# Patient Record
Sex: MALE | Race: BLACK OR AFRICAN AMERICAN | Employment: UNEMPLOYED | URBAN - METROPOLITAN AREA
[De-identification: names, ages, dates, MRNs, and addresses within clinical notes are randomized per-mention and may not be internally consistent; named-entity substitution may affect disease eponyms.]

---

## 2017-11-22 ENCOUNTER — HOSPITAL ENCOUNTER (OUTPATIENT)
Dept: PHYSICAL THERAPY | Age: 52
End: 2017-11-22

## 2018-08-16 PROBLEM — E66.01 OBESITY, MORBID (HCC): Status: ACTIVE | Noted: 2018-08-16

## 2018-08-23 PROBLEM — F32.0 CURRENT MILD EPISODE OF MAJOR DEPRESSIVE DISORDER WITHOUT PRIOR EPISODE (HCC): Status: ACTIVE | Noted: 2018-08-23

## 2018-08-23 PROBLEM — J43.2 CENTRILOBULAR EMPHYSEMA (HCC): Status: ACTIVE | Noted: 2018-08-23

## 2018-08-23 PROBLEM — R73.01 IMPAIRED FASTING GLUCOSE: Status: ACTIVE | Noted: 2018-08-23

## 2021-06-03 PROBLEM — M1A.0710 CHRONIC IDIOPATHIC GOUT INVOLVING TOE OF RIGHT FOOT WITHOUT TOPHUS: Status: ACTIVE | Noted: 2021-06-03

## 2021-06-03 PROBLEM — Z98.84 STATUS POST BARIATRIC SURGERY: Status: ACTIVE | Noted: 2021-06-03

## 2021-06-03 PROBLEM — R33.8 BENIGN PROSTATIC HYPERPLASIA WITH URINARY RETENTION: Status: ACTIVE | Noted: 2021-06-03

## 2021-06-03 PROBLEM — I10 ESSENTIAL HYPERTENSION: Status: ACTIVE | Noted: 2021-06-03

## 2021-06-03 PROBLEM — N40.1 BENIGN PROSTATIC HYPERPLASIA WITH URINARY RETENTION: Status: ACTIVE | Noted: 2021-06-03

## 2021-07-12 PROBLEM — R60.9 PERIPHERAL EDEMA: Status: ACTIVE | Noted: 2021-07-12

## 2022-03-18 PROBLEM — J43.2 CENTRILOBULAR EMPHYSEMA (HCC): Status: ACTIVE | Noted: 2018-08-23

## 2022-03-18 PROBLEM — I10 ESSENTIAL HYPERTENSION: Status: ACTIVE | Noted: 2021-06-03

## 2022-03-19 PROBLEM — R60.9 PERIPHERAL EDEMA: Status: ACTIVE | Noted: 2021-07-12

## 2022-03-19 PROBLEM — R60.0 PERIPHERAL EDEMA: Status: ACTIVE | Noted: 2021-07-12

## 2022-03-19 PROBLEM — E66.01 OBESITY, MORBID (HCC): Status: ACTIVE | Noted: 2018-08-16

## 2022-03-19 PROBLEM — F32.0 CURRENT MILD EPISODE OF MAJOR DEPRESSIVE DISORDER WITHOUT PRIOR EPISODE (HCC): Status: ACTIVE | Noted: 2018-08-23

## 2022-03-19 PROBLEM — R73.01 IMPAIRED FASTING BLOOD SUGAR: Status: ACTIVE | Noted: 2018-08-23

## 2022-03-19 PROBLEM — M1A.0710 CHRONIC IDIOPATHIC GOUT INVOLVING TOE OF RIGHT FOOT WITHOUT TOPHUS: Status: ACTIVE | Noted: 2021-06-03

## 2022-03-19 PROBLEM — Z98.84 STATUS POST BARIATRIC SURGERY: Status: ACTIVE | Noted: 2021-06-03

## 2022-03-20 PROBLEM — R33.8 BENIGN PROSTATIC HYPERPLASIA WITH URINARY RETENTION: Status: ACTIVE | Noted: 2021-06-03

## 2022-03-20 PROBLEM — N40.1 BENIGN PROSTATIC HYPERPLASIA WITH URINARY RETENTION: Status: ACTIVE | Noted: 2021-06-03

## 2025-02-25 ENCOUNTER — OFFICE VISIT (OUTPATIENT)
Dept: ORTHOPEDIC SURGERY | Age: 60
End: 2025-02-25
Payer: MEDICARE

## 2025-02-25 VITALS — BODY MASS INDEX: 35.05 KG/M2 | HEIGHT: 70 IN | WEIGHT: 244.8 LBS

## 2025-02-25 DIAGNOSIS — M17.11 PRIMARY OSTEOARTHRITIS OF RIGHT KNEE: ICD-10-CM

## 2025-02-25 DIAGNOSIS — M25.561 PAIN IN BOTH KNEES, UNSPECIFIED CHRONICITY: Primary | ICD-10-CM

## 2025-02-25 DIAGNOSIS — M17.11 ARTHRITIS OF RIGHT KNEE: Primary | ICD-10-CM

## 2025-02-25 DIAGNOSIS — M17.11 ARTHRITIS OF RIGHT KNEE: ICD-10-CM

## 2025-02-25 DIAGNOSIS — M25.562 PAIN IN BOTH KNEES, UNSPECIFIED CHRONICITY: Primary | ICD-10-CM

## 2025-02-25 DIAGNOSIS — M76.892 PES ANSERINUS TENDINITIS OF LEFT LOWER EXTREMITY: ICD-10-CM

## 2025-02-25 PROCEDURE — 99204 OFFICE O/P NEW MOD 45 MIN: CPT | Performed by: ORTHOPAEDIC SURGERY

## 2025-02-25 PROCEDURE — 1036F TOBACCO NON-USER: CPT | Performed by: ORTHOPAEDIC SURGERY

## 2025-02-25 PROCEDURE — 3017F COLORECTAL CA SCREEN DOC REV: CPT | Performed by: ORTHOPAEDIC SURGERY

## 2025-02-25 PROCEDURE — G8419 CALC BMI OUT NRM PARAM NOF/U: HCPCS | Performed by: ORTHOPAEDIC SURGERY

## 2025-02-25 PROCEDURE — G8427 DOCREV CUR MEDS BY ELIG CLIN: HCPCS | Performed by: ORTHOPAEDIC SURGERY

## 2025-02-25 RX ORDER — MULTIVITAMIN,THERAPEUTIC
1 TABLET ORAL EVERY MORNING
COMMUNITY

## 2025-02-25 RX ORDER — ALLOPURINOL 100 MG/1
100 TABLET ORAL 2 TIMES DAILY
COMMUNITY
Start: 2025-01-02

## 2025-02-25 RX ORDER — TRAMADOL HYDROCHLORIDE 50 MG/1
50 TABLET ORAL EVERY 6 HOURS PRN
COMMUNITY

## 2025-02-25 RX ORDER — ACETAMINOPHEN 500 MG
500 TABLET ORAL 3 TIMES DAILY PRN
COMMUNITY

## 2025-02-25 RX ORDER — POLYETHYLENE GLYCOL 3350 17 G/17G
17 POWDER, FOR SOLUTION ORAL DAILY
COMMUNITY
Start: 2024-12-17

## 2025-02-25 RX ORDER — METHOCARBAMOL 750 MG/1
750 TABLET, FILM COATED ORAL 3 TIMES DAILY
COMMUNITY

## 2025-02-25 RX ORDER — VALSARTAN 40 MG/1
40 TABLET ORAL DAILY
COMMUNITY
Start: 2025-01-08 | End: 2025-07-07

## 2025-02-25 NOTE — PROGRESS NOTES
Patient ID:  Yazan Lorenzo  [id number]  59 y.o.  1965    Today: February 25, 2025          Chief Complaint: Right knee pain    HPI:       Yazan Lorenzo is a 59 y.o. male seen for evaluation and treatment of right knee pain. Patient reports a longstanding history of pain involving the knee. The patient complains of knee pain with activities, reports pain as mostly occurring along the joint lines, reports stiffness of the knee with prolonged inactivity, and swelling/pain at the end of the day and after increased physical activity. Generally, symptoms improve with sitting/rest. The pain affects the patient’s activities of daily living and quality of life. Patient reports progressive pain and instability in the knee. The pain has been ongoing for an extended period of time. Pain ranges from approximately 4-8 in a cyclical fashion with periods of acute exacerbation.  He has had an injection with some e-stim therapy.  He uses a rolling walker for ambulation.  He is on no anticoagulation he is a non-smoker nondiabetic lives with his wife.  He does have a history of a left total knee arthroplasty by Dr. 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 tophus 6/3/2021    Chronic low back pain     Chronic systolic heart failure (HCC)     Daytime sleepiness     Depression     Dizziness     Exertional dyspnea     History of arthritis     History of asthma     History of CHF (congestive heart failure)     History of iron deficiency anemia     History of peripheral edema     History of tinea     Hypertension     Impaired fasting glucose 8/23/2018    Knee pain     osteoarthritis of both knees    Low energy     Memory difficulty     Morbid obesity     Musculoskeletal disorder     Nervousness     MICHELLE (obstructive sleep apnea)     Sleep apnea     SOB (shortness of breath)

## 2025-02-26 ENCOUNTER — PREP FOR PROCEDURE (OUTPATIENT)
Dept: ORTHOPEDIC SURGERY | Age: 60
End: 2025-02-26

## 2025-02-26 DIAGNOSIS — M17.11 ARTHRITIS OF RIGHT KNEE: Primary | ICD-10-CM

## 2025-02-26 RX ORDER — SODIUM CHLORIDE 0.9 % (FLUSH) 0.9 %
5-40 SYRINGE (ML) INJECTION EVERY 12 HOURS SCHEDULED
Status: CANCELLED | OUTPATIENT
Start: 2025-02-26

## 2025-02-26 RX ORDER — ACETAMINOPHEN 325 MG/1
1000 TABLET ORAL ONCE
Status: CANCELLED | OUTPATIENT
Start: 2025-02-26 | End: 2025-02-26

## 2025-02-26 RX ORDER — SODIUM CHLORIDE 9 MG/ML
INJECTION, SOLUTION INTRAVENOUS PRN
Status: CANCELLED | OUTPATIENT
Start: 2025-02-26

## 2025-02-26 RX ORDER — SODIUM CHLORIDE 0.9 % (FLUSH) 0.9 %
5-40 SYRINGE (ML) INJECTION PRN
Status: CANCELLED | OUTPATIENT
Start: 2025-02-26

## 2025-02-26 NOTE — H&P
with ROM of the right knee. Range of motion is . Trace effusion noted in the knee. Patellofemoral crepitus is present. Stable to varus valgus stress at 0 and 30, negative lachman, positive [place] for pain.  Fires ehl fhl ta gs p splt s/s/sp/dp/t wwp bcr.  significant varus alignment    Left knee exam well-healed incision no effusion knee range of motion 10 to 15 degrees lacking of full extension to 120 degrees of flexion stable to varus valgus stress at full extension no increased translation in the AP plane tenderness about the pes tendons      Xrays (obtained today or previously):    Heading: XR Knee 3/4 View  Views: AP, skier PA, lateral knee, sunrise view bilateral knee  Clinical indication: Bilateral knee Pain   Findings: Xrays of the knees obtained today or previously show tricompartmental bone-on-bone arthritic changes of the right knee with associated osteophyte formation and subchondral sclerosis.  Significant varus deformity cemented  Impression: Left Knee osteoarthritis Kellgren-Hayes grade 4    Kirt Kern MD    Assessment:   1. Arthritis of the Left knee    1. end stage osteoarthritis Right knee    Plan:   The patient has end stage osteoarthritis of the right kneewith severe worsening pain which has not improved despite non operative treatments.    X-rays demonstrate end stage osteoarthritis of the right knee  They have tried non operative treatments as outlined per HPI and have declined additional non-surgical care due to worsening pain and limited mobility including, additional NSAIDs, cortisone injections, physical therapy, assistive devices, and pain management.    The symptoms have worsened and ambulation has become difficulty.  After discussion involving shared decision making of different treatment options, they have elected to proceed with a  total knee arthroplastyand this is medically necessary for failed non operative treatment of end stage osteoarthritis.    The surgery

## 2025-02-27 ENCOUNTER — TELEPHONE (OUTPATIENT)
Dept: ORTHOPEDIC SURGERY | Age: 60
End: 2025-02-27

## 2025-02-28 NOTE — TELEPHONE ENCOUNTER
Spoke with patient and informed him that he will have blood work done at his [facility] appointment on 3/10/2025. Patient verbalized understanding and voiced no other concerns at this time.

## 2025-03-07 ENCOUNTER — TELEPHONE (OUTPATIENT)
Dept: ORTHOPEDIC SURGERY | Age: 60
End: 2025-03-07

## 2025-03-07 NOTE — TELEPHONE ENCOUNTER
He is asking for a return call about a text he got about an appt on Monday. He also has a question about joint camp. Please return his call.

## 2025-03-07 NOTE — TELEPHONE ENCOUNTER
Spoke with patient and informed him that there is parking outside of the gate and to be there at 9 am. Patient aware that joint camp appointment will be about 2 hours and he doesn't have to move his car.   Patient verbalized understanding and voiced no other concerns at this time.

## 2025-03-10 ENCOUNTER — HOSPITAL ENCOUNTER (OUTPATIENT)
Dept: REHABILITATION | Age: 60
Discharge: HOME | End: 2025-03-13
Payer: MEDICARE

## 2025-03-10 ENCOUNTER — HOSPITAL ENCOUNTER (OUTPATIENT)
Dept: SURGERY | Age: 60
Discharge: HOME | End: 2025-03-13
Payer: MEDICARE

## 2025-03-10 ENCOUNTER — TELEPHONE (OUTPATIENT)
Dept: ORTHOPEDIC SURGERY | Age: 60
End: 2025-03-10

## 2025-03-10 VITALS
HEART RATE: 78 BPM | DIASTOLIC BLOOD PRESSURE: 75 MMHG | OXYGEN SATURATION: 97 % | SYSTOLIC BLOOD PRESSURE: 136 MMHG | BODY MASS INDEX: 35.12 KG/M2 | WEIGHT: 245.3 LBS | TEMPERATURE: 97.5 F | HEIGHT: 70 IN | RESPIRATION RATE: 16 BRPM

## 2025-03-10 DIAGNOSIS — M17.11 ARTHRITIS OF RIGHT KNEE: ICD-10-CM

## 2025-03-10 LAB
ALBUMIN SERPL-MCNC: 2.6 G/DL (ref 3.5–5)
ALBUMIN/GLOB SERPL: 0.7 (ref 1–1.9)
ALP SERPL-CCNC: 96 U/L (ref 40–129)
ALT SERPL-CCNC: 50 U/L (ref 8–55)
ANION GAP SERPL CALC-SCNC: 10 MMOL/L (ref 7–16)
AST SERPL-CCNC: 121 U/L (ref 15–37)
BASOPHILS # BLD: 0.01 K/UL (ref 0–0.2)
BASOPHILS NFR BLD: 0.3 % (ref 0–2)
BILIRUB SERPL-MCNC: 0.7 MG/DL (ref 0–1.2)
BUN SERPL-MCNC: 11 MG/DL (ref 6–23)
CALCIUM SERPL-MCNC: 8.5 MG/DL (ref 8.8–10.2)
CHLORIDE SERPL-SCNC: 101 MMOL/L (ref 98–107)
CO2 SERPL-SCNC: 28 MMOL/L (ref 20–29)
CREAT SERPL-MCNC: 0.72 MG/DL (ref 0.8–1.3)
DIFFERENTIAL METHOD BLD: ABNORMAL
EKG ATRIAL RATE: 73 BPM
EKG DIAGNOSIS: NORMAL
EKG P AXIS: 52 DEGREES
EKG P-R INTERVAL: 188 MS
EKG Q-T INTERVAL: 410 MS
EKG QRS DURATION: 104 MS
EKG QTC CALCULATION (BAZETT): 451 MS
EKG R AXIS: 43 DEGREES
EKG T AXIS: 33 DEGREES
EKG VENTRICULAR RATE: 73 BPM
EOSINOPHIL # BLD: 0.02 K/UL (ref 0–0.8)
EOSINOPHIL NFR BLD: 0.5 % (ref 0.5–7.8)
ERYTHROCYTE [DISTWIDTH] IN BLOOD BY AUTOMATED COUNT: 13.8 % (ref 11.9–14.6)
EST. AVERAGE GLUCOSE BLD GHB EST-MCNC: 79 MG/DL
GLOBULIN SER CALC-MCNC: 3.8 G/DL (ref 2.3–3.5)
GLUCOSE SERPL-MCNC: 96 MG/DL (ref 70–99)
HBA1C MFR BLD: 4.4 % (ref 0–5.6)
HCT VFR BLD AUTO: 36.3 % (ref 41.1–50.3)
HGB BLD-MCNC: 12.1 G/DL (ref 13.6–17.2)
IMM GRANULOCYTES # BLD AUTO: 0.01 K/UL (ref 0–0.5)
IMM GRANULOCYTES NFR BLD AUTO: 0.3 % (ref 0–5)
INR PPP: 1
LYMPHOCYTES # BLD: 0.93 K/UL (ref 0.5–4.6)
LYMPHOCYTES NFR BLD: 24.9 % (ref 13–44)
MCH RBC QN AUTO: 35.7 PG (ref 26.1–32.9)
MCHC RBC AUTO-ENTMCNC: 33.3 G/DL (ref 31.4–35)
MCV RBC AUTO: 107.1 FL (ref 82–102)
MONOCYTES # BLD: 0.69 K/UL (ref 0.1–1.3)
MONOCYTES NFR BLD: 18.4 % (ref 4–12)
MRSA DNA SPEC QL NAA+PROBE: NOT DETECTED
NEUTS SEG # BLD: 2.08 K/UL (ref 1.7–8.2)
NEUTS SEG NFR BLD: 55.6 % (ref 43–78)
NRBC # BLD: 0 K/UL (ref 0–0.2)
PLATELET # BLD AUTO: 124 K/UL (ref 150–450)
PMV BLD AUTO: 9.8 FL (ref 9.4–12.3)
POTASSIUM SERPL-SCNC: 3.8 MMOL/L (ref 3.5–5.1)
PROT SERPL-MCNC: 6.4 G/DL (ref 6.3–8.2)
PROTHROMBIN TIME: 13.7 SEC (ref 11.3–14.9)
RBC # BLD AUTO: 3.39 M/UL (ref 4.23–5.6)
S AUREUS CPE NOSE QL NAA+PROBE: DETECTED
SODIUM SERPL-SCNC: 139 MMOL/L (ref 136–145)
WBC # BLD AUTO: 3.7 K/UL (ref 4.3–11.1)

## 2025-03-10 PROCEDURE — 87641 MR-STAPH DNA AMP PROBE: CPT

## 2025-03-10 PROCEDURE — 97161 PT EVAL LOW COMPLEX 20 MIN: CPT

## 2025-03-10 PROCEDURE — 85610 PROTHROMBIN TIME: CPT

## 2025-03-10 PROCEDURE — 93005 ELECTROCARDIOGRAM TRACING: CPT

## 2025-03-10 PROCEDURE — 80053 COMPREHEN METABOLIC PANEL: CPT

## 2025-03-10 PROCEDURE — 93010 ELECTROCARDIOGRAM REPORT: CPT | Performed by: INTERNAL MEDICINE

## 2025-03-10 PROCEDURE — 94760 N-INVAS EAR/PLS OXIMETRY 1: CPT

## 2025-03-10 PROCEDURE — 85025 COMPLETE CBC W/AUTO DIFF WBC: CPT

## 2025-03-10 PROCEDURE — 83036 HEMOGLOBIN GLYCOSYLATED A1C: CPT

## 2025-03-10 ASSESSMENT — PAIN DESCRIPTION - DESCRIPTORS: DESCRIPTORS: ACHING;CRAMPING;SHARP

## 2025-03-10 ASSESSMENT — PAIN DESCRIPTION - LOCATION
LOCATION: KNEE
LOCATION: KNEE

## 2025-03-10 ASSESSMENT — PAIN SCALES - GENERAL: PAINLEVEL_OUTOF10: 5

## 2025-03-10 ASSESSMENT — KOOS JR
STRAIGHTENING KNEE FULLY: SEVERE
BENDING TO THE FLOOR TO PICK UP OBJECT: SEVERE
RISING FROM SITTING: SEVERE
GOING UP OR DOWN STAIRS: SEVERE
HOW SEVERE IS YOUR KNEE STIFFNESS AFTER FIRST WAKING IN MORNING: SEVERE
TWISING OR PIVOTING ON KNEE: SEVERE
STANDING UPRIGHT: SEVERE
KOOS JR TOTAL INTERVAL SCORE: 34.174

## 2025-03-10 ASSESSMENT — PULMONARY FUNCTION TESTS
FEV1 (%PREDICTED): 59
FEV1 (LITERS): 1.77

## 2025-03-10 ASSESSMENT — PAIN DESCRIPTION - ORIENTATION: ORIENTATION: RIGHT

## 2025-03-10 NOTE — PERIOP NOTE
PLEASE CONTINUE TAKING ALL PRESCRIPTION MEDICATIONS UP TO THE DAY OF SURGERY UNLESS OTHERWISE DIRECTED BELOW.    DISCONTINUE all vitamins, herbals and supplements 3 weeks prior to surgery. DISCONTINUE Non-Steroidal Anti-Inflammatory (NSAIDS) such as Advil, Ibuprofen, Motrin, Naproxen and Aleve 5 days prior to surgery.       Home Medications to take  the day of surgery      Allopurinol (Zyloprim)     Montelukast (Singulair)     Tamsulosin (Flomax)     Home Medications to Hold- please continue all other medications except these.    Vitamin B complex  Vitamin C  Multivitamin   Omega 3 (Fish Oil)  Diclofenac (Voltaren Gel)- hold 5 days prior to surgery     Comments   Bring Dynahex wash and Incentive Spirometer with you to hospital on the day of surgery.             Please do not bring home medications with you on the day of surgery unless otherwise directed by your nurse.  If you are instructed to bring home medications, please give them to your nurse as they will be administered by the nursing staff.    If you have any questions, please call [facility] [phone].    A copy of this note was provided to the patient for reference.    How to Use Your Incentive Spirometer       About Your Incentive Spirometer  An incentive spirometer is a device that will expand your lungs by helping you to breathe more deeply and fully. The parts of your incentive spirometer are labeled in Figure 1.    Using your incentive spirometer  When you're using your incentive spirometer, make sure to breathe through your mouth. If you breathe through your nose, the incentive spirometer won't work properly. You can hold your nose if you have trouble. DO NOT BLOW INTO THE DEVICE. If you feel dizzy at any time, stop and rest. Try again at a later time.  Sit upright in a chair or in bed. Hold the incentive spirometer at eye level.   Put the mouthpiece in your mouth and close your lips tightly around it. Slowly breathe out

## 2025-03-10 NOTE — TELEPHONE ENCOUNTER
He had labs drawn at [facility] this morning and would like the results. Please give him a call.

## 2025-03-10 NOTE — CARE COORDINATION
[facility] Case Management note:  Patient screened in Prehab for discharge planning needs. Patient scheduled for a future total joint replacement.  We discussed Home Health and equipment needed after surgery. List of Home Health providers offered.  Patient w/o preference towards provider.  We will arrange Home health on the day of surgery.  Has two walkers and grab bar around toilet, and shower chair.

## 2025-03-10 NOTE — PROGRESS NOTES
Yazan Lorenzo  : 1965  Primary: [employer]  Secondary:  [facility] at [place] [address]  [phone]      Physical Therapy Prehab Evaluation Summary:3/10/2025   Time In/Out   PT Charge Capture  Episode     MEDICAL/REFERRING DIAGNOSIS: Unilateral primary osteoarthritis, right knee [M17.11]  REFERRING PHYSICIAN: Kirt Kern MD    Treatment Diagnosis:   Pain in Right Knee (M25.561)  Stiffness of Right Knee, Not elsewhere classified (M25.661)  Difficulty in walking, Not elsewhere classified (R26.2)    DATE OF SURGERY: 4/3/25  Assessment:   COMMENTS:  Mr. Lorenzo is present for a Prehab Physical Therapy Assessment for their upcoming right TKA. They are here alone. After discussing the surgical admission options and discharge plans, they are planning on discharging on day of surgery.    He will have support from his wife. He had a left tka last year.  He is on a rollator and reports he has back pain and spasms.     PROBLEM LIST:   (Impacting functional limitations):  Mr. Lorenzo presents with the following lower extremity(s) problems:  Strength  Range of Motion  Home Exercise Program  Pain INTERVENTIONS PLANNED:   (Benefits and precautions of physical therapy have been discussed with the patient.)  Home Exercise Program  Educational Discussion       GOALS: (Goals have been discussed and agreed upon with patient.)  Discharge Goals: Time Frame: 1 Day  Patient will demonstrate independence with a home exercise program designed to increase strength, range of motion, and pain control to minimize functional deficits and optimize patient for total joint replacement.    Subjective:   Past Medical History/Comorbidities:   Mr. Lorenzo  has a past medical history of Centrilobular emphysema (HCC), Chronic idiopathic gout involving toe of right foot without tophus, Chronic low back pain, Chronic systolic heart failure (HCC), Daytime sleepiness,

## 2025-03-10 NOTE — PERIOP NOTE
Patient verified name and .    Order for consent was found in EHR and does match case posting; patient verified.     Type 3 surgery, [facility] assessment complete.    Labs per surgeon: CBC with diff,CMP, A1C, PT/INR, MRSA ; results pending. T/S ordered DOS.  Labs per anesthesia protocol: no additional  EKG:EKG 3.10.25    Procedure Pass- Labs completed on 12.15.24 AST: 620,ALT:266. Alkaline Phosphate: 255, Bilirubin:3.1, Albumin:2.5. Pt with hx of alcohol dependence. Pt had a Lap. Cholecystectomy on 24. Pt states his last alcoholic drink was over 3 months ago. Pt LFT's remain elevated. PCP has placed [facility]. Pt states he plans to contact [facility] today as instructed by his PCP. Pt states he had his Left knee replacement at [facility] and has attempted to schedule right knee replacement with no return call. At some point GI clearance was requested prior to knee surgery per office visit on 25.     MRSA/MSSA swab collected per policy. MD to consult pharmacy to dose Vanc if appropriate.     Hospital approved surgical skin cleanser and instructions to return bottle on DOS given per hospital policy.    Patient provided with handouts including Guide to Surgery, Pain Management, Preventing Surgical Site Infections, and [facility] Brochure.    Patient answered medical/surgical history questions at their best of ability. All prior to admission medications documented in Epic. Original medication prescription bottle was not visualized during patient appointment.     Patient instructed to hold all vitamins 3 weeks prior to surgery and NSAIDS 5 days prior to surgery.     Patient teach back successful and patient demonstrates knowledge of instruction.

## 2025-03-10 NOTE — PERIOP NOTE
Labs within anesthesia guidelines, no follow-up required.  ALT: 50, , WBC:3.7-Labs automatically routed to ordering provider via Epic documentation. MRSA received in lab with pending results.

## 2025-03-10 NOTE — TELEPHONE ENCOUNTER
Spoke with patient and informed him that his labs results are in mychart and if there is a problem someone will call him to let him know.   Patient verbalized understanding and voiced no other concerns at this time.

## 2025-03-10 NOTE — PROGRESS NOTES
03/10/25 1000   Treatment   Treatment Type Bedside spirometry   Breath Sounds   Breath Sounds Bilateral Diminished   Oxygen Therapy/Pulse Ox   O2 Therapy Room air   Pulse 78   SpO2 97 %   Pulse Oximeter Device Mode Intermittent   $Pulse Oximeter $Spot check (single)   Bedside Spirometry   FEV-1/Actual (Liters) 1.77 Liters   FEV-1/Predicted (Liters) 59 Liters     Initial respiratory Assessment completed with pt. Pt was interviewed and evaluated in [facility] prior to surgery.  Patient ID:  Yazan Lorenzo  [id number]  59 y.o.  1965  Surgeon:  HEATHER  Date of Surgery: [unfilled]4/3/2025  Procedure: Total  Arthroplasty  Primary Care Physician: Krish Rahman [phone]  Specialists:    Pt taught proper COUGH technique  IS REVIEWED WITH PT AS WELL AS BENEFITS OF USING IS IN SEDENTARY PTS.  DIAPHRAGMATIC BREATHING EXERCISE INSTRUCTIONS GIVEN    History of smoking:   DENIES                 Quit date:         Secondhand smoke:DENIES    Past procedures with Oxygen desaturation or delayed awakening:DENIES     Respiratory history:COPD/ASTHMA  MICHELLE - NO CPAP                                 SOB  ON EXERTION                                    Respiratory meds:  DENIES    FAMILY PRESENT:             NO     PAST SLEEP STUDY:        YES                   LOST WEIGHT BUT NEVER HAD FOLLOW UP STUDY  HX OF MICHELLE:                        YES                      MICHELLE assessment:     DANGERS OF UNTREATED MICHELLE EXPLAINED TO PT.                                          SLEEPS ON SIDE         PHYSICAL EXAM   Body mass index is 35.2 kg/m².   Vitals:    03/10/25 1000   BP:    Pulse: (P) 78   Resp:    Temp:    SpO2: (P) 97%     Neck [id number]      cm    Loud snoring:                                                 YES            Witnessed apnea or wakening gasping or choking:        DENIES         Awakens with headaches:                                               DENIES  Morning or daytime tiredness/ sleepiness:

## 2025-03-18 ENCOUNTER — TELEPHONE (OUTPATIENT)
Dept: ORTHOPEDIC SURGERY | Age: 60
End: 2025-03-18

## 2025-03-18 NOTE — TELEPHONE ENCOUNTER
Spoke with patient and rescheduled Pre Op to 3/26/2025 @ 1 pm at [facility]. Patient verbalized understanding and voiced no other concerns at this time.

## 2025-03-26 ENCOUNTER — OFFICE VISIT (OUTPATIENT)
Dept: ORTHOPEDIC SURGERY | Age: 60
End: 2025-03-26

## 2025-03-26 VITALS — WEIGHT: 243.4 LBS | HEIGHT: 70 IN | BODY MASS INDEX: 34.84 KG/M2

## 2025-03-26 DIAGNOSIS — M17.11 ARTHRITIS OF RIGHT KNEE: Primary | ICD-10-CM

## 2025-03-26 PROCEDURE — 99024 POSTOP FOLLOW-UP VISIT: CPT | Performed by: ORTHOPAEDIC SURGERY

## 2025-03-26 RX ORDER — TRAMADOL HYDROCHLORIDE 50 MG/1
50 TABLET ORAL EVERY 6 HOURS PRN
Qty: 28 TABLET | Refills: 0 | Status: SHIPPED | OUTPATIENT
Start: 2025-03-26 | End: 2025-04-02

## 2025-03-26 RX ORDER — CELECOXIB 200 MG/1
200 CAPSULE ORAL 2 TIMES DAILY
Qty: 60 CAPSULE | Refills: 1 | Status: SHIPPED | OUTPATIENT
Start: 2025-03-26

## 2025-03-26 RX ORDER — OXYCODONE HYDROCHLORIDE 5 MG/1
5-10 TABLET ORAL EVERY 4 HOURS PRN
Qty: 30 TABLET | Refills: 0 | Status: SHIPPED | OUTPATIENT
Start: 2025-03-26 | End: 2025-04-02

## 2025-03-26 RX ORDER — SENNA AND DOCUSATE SODIUM 50; 8.6 MG/1; MG/1
1 TABLET, FILM COATED ORAL DAILY
Qty: 30 TABLET | Refills: 1 | Status: SHIPPED | OUTPATIENT
Start: 2025-03-26

## 2025-03-26 RX ORDER — ASPIRIN 81 MG/1
81 TABLET ORAL EVERY 12 HOURS
Qty: 70 TABLET | Refills: 0 | Status: SHIPPED | OUTPATIENT
Start: 2025-03-26

## 2025-03-26 RX ORDER — ACETAMINOPHEN 325 MG/1
975 TABLET ORAL EVERY 8 HOURS
Qty: 60 TABLET | Refills: 2 | Status: SHIPPED | OUTPATIENT
Start: 2025-03-26

## 2025-03-26 NOTE — H&P (VIEW-ONLY)
Patient ID:  Yazan Lorenzo  [id number]  59 y.o.  1965    Today: March 26, 2025          Chief Complaint: Right knee pain    HPI:       Yazan Lorenzo is a 59 y.o. male seen for evaluation and treatment of right knee pain. This is his preop visit.           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 Patient has stopped drinking for a few months. Referral to Gastroenterology for further evaluation has been delayed until July.    Exertional dyspnea     H/O gastric sleeve     History of alcohol abuse     History of arthritis     History of asthma     History of CHF (congestive heart failure)     History of iron deficiency anemia     History of peripheral edema     History of tinea     Hypertension     Impaired fasting glucose 8/23/2018    Knee pain     osteoarthritis of both knees    Low energy     Memory difficulty     Morbid obesity     BMI:35    Musculoskeletal disorder     Nervousness     MICHELLE (obstructive sleep apnea)     no longer required due to weight loss    SOB (shortness of breath)     3.10.25 pt denies any recent SOB or CP    Wears glasses     to read    Wheezing     3.10.25- no wheezing at present time       Past Surgical History:  Past Surgical History:   Procedure Laterality Date    CHOLECYSTECTOMY, LAPAROSCOPIC  12/26/2024    COLONOSCOPY  12/06/2020    No Polyps returnin 5 years.    GASTRIC BYPASS SURGERY      TOTAL KNEE ARTHROPLASTY Left 06/26/2023        Medications:     Prior to Admission medications    Medication Sig Start Date End Date Taking? Authorizing Provider   allopurinol (ZYLOPRIM) 100 MG tablet Take 1 tablet by mouth 2

## 2025-03-26 NOTE — PROGRESS NOTES
Patient ID:  Yazan Lorenzo  [id number]  59 y.o.  1965    Today: March 26, 2025          Chief Complaint: Right knee pain    HPI:       Yazan Lorenzo is a 59 y.o. male seen for evaluation and treatment of right knee pain. This is his preop visit.           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 Patient has stopped drinking for a few months. Referral to Gastroenterology for further evaluation has been delayed until July.    Exertional dyspnea     H/O gastric sleeve     History of alcohol abuse     History of arthritis     History of asthma     History of CHF (congestive heart failure)     History of iron deficiency anemia     History of peripheral edema     History of tinea     Hypertension     Impaired fasting glucose 8/23/2018    Knee pain     osteoarthritis of both knees    Low energy     Memory difficulty     Morbid obesity     BMI:35    Musculoskeletal disorder     Nervousness     MICHELLE (obstructive sleep apnea)     no longer required due to weight loss    SOB (shortness of breath)     3.10.25 pt denies any recent SOB or CP    Wears glasses     to read    Wheezing     3.10.25- no wheezing at present time       Past Surgical History:  Past Surgical History:   Procedure Laterality Date    CHOLECYSTECTOMY, LAPAROSCOPIC  12/26/2024    COLONOSCOPY  12/06/2020    No Polyps returnin 5 years.    GASTRIC BYPASS SURGERY      TOTAL KNEE ARTHROPLASTY Left 06/26/2023        Medications:     Prior to Admission medications    Medication Sig Start Date End Date Taking? Authorizing Provider   allopurinol (ZYLOPRIM) 100 MG tablet Take 1 tablet by mouth 2

## 2025-04-01 ENCOUNTER — APPOINTMENT (OUTPATIENT)
Dept: GENERAL RADIOLOGY | Age: 60
End: 2025-04-01
Attending: ORTHOPAEDIC SURGERY
Payer: MEDICARE

## 2025-04-01 ENCOUNTER — HOSPITAL ENCOUNTER (OUTPATIENT)
Age: 60
Discharge: HOME HEALTH | End: 2025-04-01
Attending: ORTHOPAEDIC SURGERY | Admitting: ORTHOPAEDIC SURGERY
Payer: MEDICARE

## 2025-04-01 ENCOUNTER — ANESTHESIA EVENT (OUTPATIENT)
Dept: SURGERY | Age: 60
End: 2025-04-01
Payer: MEDICARE

## 2025-04-01 ENCOUNTER — ANESTHESIA (OUTPATIENT)
Dept: SURGERY | Age: 60
End: 2025-04-01
Payer: MEDICARE

## 2025-04-01 VITALS
HEART RATE: 77 BPM | HEIGHT: 70 IN | TEMPERATURE: 97.9 F | BODY MASS INDEX: 34.7 KG/M2 | RESPIRATION RATE: 16 BRPM | SYSTOLIC BLOOD PRESSURE: 131 MMHG | WEIGHT: 242.4 LBS | OXYGEN SATURATION: 99 % | DIASTOLIC BLOOD PRESSURE: 77 MMHG

## 2025-04-01 PROBLEM — M17.11 ARTHRITIS OF RIGHT KNEE: Status: ACTIVE | Noted: 2025-04-01

## 2025-04-01 PROCEDURE — 2709999900 HC NON-CHARGEABLE SUPPLY: Performed by: ORTHOPAEDIC SURGERY

## 2025-04-01 PROCEDURE — 6360000002 HC RX W HCPCS: Performed by: ORTHOPAEDIC SURGERY

## 2025-04-01 PROCEDURE — 3700000000 HC ANESTHESIA ATTENDED CARE: Performed by: ORTHOPAEDIC SURGERY

## 2025-04-01 PROCEDURE — 94760 N-INVAS EAR/PLS OXIMETRY 1: CPT

## 2025-04-01 PROCEDURE — 6370000000 HC RX 637 (ALT 250 FOR IP): Performed by: ORTHOPAEDIC SURGERY

## 2025-04-01 PROCEDURE — 6360000002 HC RX W HCPCS: Performed by: ANESTHESIOLOGY

## 2025-04-01 PROCEDURE — 6370000000 HC RX 637 (ALT 250 FOR IP): Performed by: ANESTHESIOLOGY

## 2025-04-01 PROCEDURE — 6360000002 HC RX W HCPCS: Performed by: NURSE ANESTHETIST, CERTIFIED REGISTERED

## 2025-04-01 PROCEDURE — 97535 SELF CARE MNGMENT TRAINING: CPT

## 2025-04-01 PROCEDURE — 3600000005 HC SURGERY LEVEL 5 BASE: Performed by: ORTHOPAEDIC SURGERY

## 2025-04-01 PROCEDURE — 7100000001 HC PACU RECOVERY - ADDTL 15 MIN: Performed by: ORTHOPAEDIC SURGERY

## 2025-04-01 PROCEDURE — 27447 TOTAL KNEE ARTHROPLASTY: CPT | Performed by: ORTHOPAEDIC SURGERY

## 2025-04-01 PROCEDURE — 97165 OT EVAL LOW COMPLEX 30 MIN: CPT

## 2025-04-01 PROCEDURE — 97530 THERAPEUTIC ACTIVITIES: CPT

## 2025-04-01 PROCEDURE — 3600000015 HC SURGERY LEVEL 5 ADDTL 15MIN: Performed by: ORTHOPAEDIC SURGERY

## 2025-04-01 PROCEDURE — 97110 THERAPEUTIC EXERCISES: CPT

## 2025-04-01 PROCEDURE — 7100000000 HC PACU RECOVERY - FIRST 15 MIN: Performed by: ORTHOPAEDIC SURGERY

## 2025-04-01 PROCEDURE — C1776 JOINT DEVICE (IMPLANTABLE): HCPCS | Performed by: ORTHOPAEDIC SURGERY

## 2025-04-01 PROCEDURE — 73560 X-RAY EXAM OF KNEE 1 OR 2: CPT

## 2025-04-01 PROCEDURE — 64447 NJX AA&/STRD FEMORAL NRV IMG: CPT | Performed by: ANESTHESIOLOGY

## 2025-04-01 PROCEDURE — 97161 PT EVAL LOW COMPLEX 20 MIN: CPT

## 2025-04-01 PROCEDURE — 2500000003 HC RX 250 WO HCPCS: Performed by: NURSE ANESTHETIST, CERTIFIED REGISTERED

## 2025-04-01 PROCEDURE — 2580000003 HC RX 258: Performed by: ANESTHESIOLOGY

## 2025-04-01 PROCEDURE — 3700000001 HC ADD 15 MINUTES (ANESTHESIA): Performed by: ORTHOPAEDIC SURGERY

## 2025-04-01 DEVICE — ATTUNE KNEE SYSTEM TIBIAL INSERT FIXED BEARING MEDIAL STABILIZED RIGHT AOX 8, 5MM
Type: IMPLANTABLE DEVICE | Site: KNEE | Status: FUNCTIONAL
Brand: ATTUNE

## 2025-04-01 DEVICE — ATTUNE KNEE SYSTEM FEMORAL POROCOAT CRUCIATE RETAINING SIZE 8 RIGHT CEMENTLESS
Type: IMPLANTABLE DEVICE | Site: KNEE | Status: FUNCTIONAL
Brand: ATTUNE

## 2025-04-01 DEVICE — ATTUNE KNEE SYSTEM TIBIAL BASE AFFIXIUM FIXED BEARING SIZE 7
Type: IMPLANTABLE DEVICE | Site: KNEE | Status: FUNCTIONAL
Brand: ATTUNE AFFIXIUM

## 2025-04-01 DEVICE — KNEE K2 TOT HEMI ADV CMTLS IMPL CAPPED SYNTHES: Type: IMPLANTABLE DEVICE | Status: FUNCTIONAL

## 2025-04-01 RX ORDER — SODIUM CHLORIDE 0.9 % (FLUSH) 0.9 %
5-40 SYRINGE (ML) INJECTION PRN
Status: DISCONTINUED | OUTPATIENT
Start: 2025-04-01 | End: 2025-04-01 | Stop reason: HOSPADM

## 2025-04-01 RX ORDER — DIPHENHYDRAMINE HYDROCHLORIDE 50 MG/ML
25 INJECTION, SOLUTION INTRAMUSCULAR; INTRAVENOUS EVERY 6 HOURS PRN
Status: DISCONTINUED | OUTPATIENT
Start: 2025-04-01 | End: 2025-04-01 | Stop reason: HOSPADM

## 2025-04-01 RX ORDER — ASPIRIN 81 MG/1
81 TABLET ORAL 2 TIMES DAILY
Status: DISCONTINUED | OUTPATIENT
Start: 2025-04-01 | End: 2025-04-01 | Stop reason: HOSPADM

## 2025-04-01 RX ORDER — KETAMINE HCL IN NACL, ISO-OSM 20 MG/2 ML
SYRINGE (ML) INJECTION
Status: DISCONTINUED | OUTPATIENT
Start: 2025-04-01 | End: 2025-04-01 | Stop reason: SDUPTHER

## 2025-04-01 RX ORDER — ROPIVACAINE HYDROCHLORIDE 2 MG/ML
INJECTION, SOLUTION EPIDURAL; INFILTRATION; PERINEURAL PRN
Status: DISCONTINUED | OUTPATIENT
Start: 2025-04-01 | End: 2025-04-01 | Stop reason: HOSPADM

## 2025-04-01 RX ORDER — ONDANSETRON 4 MG/1
4 TABLET, ORALLY DISINTEGRATING ORAL EVERY 8 HOURS PRN
Status: DISCONTINUED | OUTPATIENT
Start: 2025-04-01 | End: 2025-04-01 | Stop reason: HOSPADM

## 2025-04-01 RX ORDER — FENTANYL CITRATE 50 UG/ML
100 INJECTION, SOLUTION INTRAMUSCULAR; INTRAVENOUS
Status: COMPLETED | OUTPATIENT
Start: 2025-04-01 | End: 2025-04-01

## 2025-04-01 RX ORDER — SODIUM CHLORIDE 9 MG/ML
INJECTION, SOLUTION INTRAVENOUS PRN
Status: DISCONTINUED | OUTPATIENT
Start: 2025-04-01 | End: 2025-04-01 | Stop reason: HOSPADM

## 2025-04-01 RX ORDER — TRANEXAMIC ACID 100 MG/ML
INJECTION, SOLUTION INTRAVENOUS
Status: DISCONTINUED | OUTPATIENT
Start: 2025-04-01 | End: 2025-04-01 | Stop reason: SDUPTHER

## 2025-04-01 RX ORDER — DEXAMETHASONE SODIUM PHOSPHATE 4 MG/ML
INJECTION, SOLUTION INTRA-ARTICULAR; INTRALESIONAL; INTRAMUSCULAR; INTRAVENOUS; SOFT TISSUE
Status: COMPLETED | OUTPATIENT
Start: 2025-04-01 | End: 2025-04-01

## 2025-04-01 RX ORDER — MIDAZOLAM HYDROCHLORIDE 2 MG/2ML
4 INJECTION, SOLUTION INTRAMUSCULAR; INTRAVENOUS
Status: COMPLETED | OUTPATIENT
Start: 2025-04-01 | End: 2025-04-01

## 2025-04-01 RX ORDER — SODIUM CHLORIDE, SODIUM LACTATE, POTASSIUM CHLORIDE, CALCIUM CHLORIDE 600; 310; 30; 20 MG/100ML; MG/100ML; MG/100ML; MG/100ML
INJECTION, SOLUTION INTRAVENOUS CONTINUOUS
Status: DISCONTINUED | OUTPATIENT
Start: 2025-04-01 | End: 2025-04-01 | Stop reason: HOSPADM

## 2025-04-01 RX ORDER — DIPHENHYDRAMINE HCL 25 MG
25 CAPSULE ORAL EVERY 6 HOURS PRN
Status: DISCONTINUED | OUTPATIENT
Start: 2025-04-01 | End: 2025-04-01 | Stop reason: HOSPADM

## 2025-04-01 RX ORDER — DIPHENHYDRAMINE HYDROCHLORIDE 50 MG/ML
INJECTION, SOLUTION INTRAMUSCULAR; INTRAVENOUS
Status: DISCONTINUED | OUTPATIENT
Start: 2025-04-01 | End: 2025-04-01 | Stop reason: SDUPTHER

## 2025-04-01 RX ORDER — LIDOCAINE HYDROCHLORIDE 10 MG/ML
1 INJECTION, SOLUTION INFILTRATION; PERINEURAL
Status: DISCONTINUED | OUTPATIENT
Start: 2025-04-01 | End: 2025-04-01 | Stop reason: HOSPADM

## 2025-04-01 RX ORDER — SENNA AND DOCUSATE SODIUM 50; 8.6 MG/1; MG/1
1 TABLET, FILM COATED ORAL 2 TIMES DAILY
Status: DISCONTINUED | OUTPATIENT
Start: 2025-04-01 | End: 2025-04-01 | Stop reason: HOSPADM

## 2025-04-01 RX ORDER — NALOXONE HYDROCHLORIDE 0.4 MG/ML
INJECTION, SOLUTION INTRAMUSCULAR; INTRAVENOUS; SUBCUTANEOUS PRN
Status: DISCONTINUED | OUTPATIENT
Start: 2025-04-01 | End: 2025-04-01 | Stop reason: HOSPADM

## 2025-04-01 RX ORDER — KETOROLAC TROMETHAMINE 30 MG/ML
INJECTION, SOLUTION INTRAMUSCULAR; INTRAVENOUS PRN
Status: DISCONTINUED | OUTPATIENT
Start: 2025-04-01 | End: 2025-04-01 | Stop reason: HOSPADM

## 2025-04-01 RX ORDER — KETOROLAC TROMETHAMINE 30 MG/ML
30 INJECTION, SOLUTION INTRAMUSCULAR; INTRAVENOUS EVERY 6 HOURS
Status: DISCONTINUED | OUTPATIENT
Start: 2025-04-01 | End: 2025-04-01 | Stop reason: HOSPADM

## 2025-04-01 RX ORDER — OXYCODONE HYDROCHLORIDE 5 MG/1
5 TABLET ORAL
Status: COMPLETED | OUTPATIENT
Start: 2025-04-01 | End: 2025-04-01

## 2025-04-01 RX ORDER — SODIUM CHLORIDE 0.9 % (FLUSH) 0.9 %
5-40 SYRINGE (ML) INJECTION EVERY 12 HOURS SCHEDULED
Status: DISCONTINUED | OUTPATIENT
Start: 2025-04-01 | End: 2025-04-01 | Stop reason: HOSPADM

## 2025-04-01 RX ORDER — CYCLOBENZAPRINE HCL 10 MG
10 TABLET ORAL EVERY 12 HOURS PRN
Status: DISCONTINUED | OUTPATIENT
Start: 2025-04-01 | End: 2025-04-01 | Stop reason: HOSPADM

## 2025-04-01 RX ORDER — ONDANSETRON 2 MG/ML
4 INJECTION INTRAMUSCULAR; INTRAVENOUS
Status: DISCONTINUED | OUTPATIENT
Start: 2025-04-01 | End: 2025-04-01 | Stop reason: HOSPADM

## 2025-04-01 RX ORDER — OXYCODONE HYDROCHLORIDE 5 MG/1
10 TABLET ORAL
Status: DISCONTINUED | OUTPATIENT
Start: 2025-04-01 | End: 2025-04-01 | Stop reason: HOSPADM

## 2025-04-01 RX ORDER — ONDANSETRON 2 MG/ML
4 INJECTION INTRAMUSCULAR; INTRAVENOUS EVERY 6 HOURS PRN
Status: DISCONTINUED | OUTPATIENT
Start: 2025-04-01 | End: 2025-04-01 | Stop reason: HOSPADM

## 2025-04-01 RX ORDER — ACETAMINOPHEN 500 MG
1000 TABLET ORAL EVERY 8 HOURS SCHEDULED
Status: DISCONTINUED | OUTPATIENT
Start: 2025-04-01 | End: 2025-04-01 | Stop reason: HOSPADM

## 2025-04-01 RX ORDER — SODIUM CHLORIDE 9 MG/ML
INJECTION, SOLUTION INTRAVENOUS CONTINUOUS
Status: DISCONTINUED | OUTPATIENT
Start: 2025-04-01 | End: 2025-04-01 | Stop reason: HOSPADM

## 2025-04-01 RX ORDER — ONDANSETRON 2 MG/ML
INJECTION INTRAMUSCULAR; INTRAVENOUS
Status: DISCONTINUED | OUTPATIENT
Start: 2025-04-01 | End: 2025-04-01 | Stop reason: SDUPTHER

## 2025-04-01 RX ORDER — ACETAMINOPHEN 500 MG
1000 TABLET ORAL ONCE
Status: COMPLETED | OUTPATIENT
Start: 2025-04-01 | End: 2025-04-01

## 2025-04-01 RX ORDER — PROPOFOL 10 MG/ML
INJECTION, EMULSION INTRAVENOUS
Status: DISCONTINUED | OUTPATIENT
Start: 2025-04-01 | End: 2025-04-01 | Stop reason: SDUPTHER

## 2025-04-01 RX ORDER — OXYCODONE HYDROCHLORIDE 5 MG/1
5 TABLET ORAL
Status: DISCONTINUED | OUTPATIENT
Start: 2025-04-01 | End: 2025-04-01 | Stop reason: HOSPADM

## 2025-04-01 RX ADMIN — PROPOFOL 120 MCG/KG/MIN: 10 INJECTION, EMULSION INTRAVENOUS at 09:43

## 2025-04-01 RX ADMIN — MEPIVACAINE HYDROCHLORIDE 60 MG: 20 INJECTION, SOLUTION EPIDURAL; INFILTRATION at 09:36

## 2025-04-01 RX ADMIN — OXYCODONE HYDROCHLORIDE 5 MG: 5 TABLET ORAL at 12:12

## 2025-04-01 RX ADMIN — TRANEXAMIC ACID 1000 MG: 1 INJECTION, SOLUTION INTRAVENOUS at 09:34

## 2025-04-01 RX ADMIN — OXYCODONE HYDROCHLORIDE 10 MG: 5 TABLET ORAL at 15:11

## 2025-04-01 RX ADMIN — KETOROLAC TROMETHAMINE 30 MG: 30 INJECTION, SOLUTION INTRAMUSCULAR at 13:56

## 2025-04-01 RX ADMIN — FENTANYL CITRATE 50 MCG: 50 INJECTION INTRAMUSCULAR; INTRAVENOUS at 08:13

## 2025-04-01 RX ADMIN — HYDROMORPHONE HYDROCHLORIDE 0.5 MG: 1 INJECTION, SOLUTION INTRAMUSCULAR; INTRAVENOUS; SUBCUTANEOUS at 11:57

## 2025-04-01 RX ADMIN — Medication 20 MG: at 09:51

## 2025-04-01 RX ADMIN — ONDANSETRON 4 MG: 2 INJECTION, SOLUTION INTRAMUSCULAR; INTRAVENOUS at 10:09

## 2025-04-01 RX ADMIN — TRANEXAMIC ACID 500 MG: 1 INJECTION, SOLUTION INTRAVENOUS at 10:57

## 2025-04-01 RX ADMIN — DIPHENHYDRAMINE HYDROCHLORIDE 50 MG: 50 INJECTION INTRAMUSCULAR; INTRAVENOUS at 10:00

## 2025-04-01 RX ADMIN — PROPOFOL 100 MG: 10 INJECTION, EMULSION INTRAVENOUS at 09:47

## 2025-04-01 RX ADMIN — ROPIVACAINE HYDROCHLORIDE 20 ML: 2 INJECTION, SOLUTION EPIDURAL; INFILTRATION at 08:12

## 2025-04-01 RX ADMIN — MIDAZOLAM HYDROCHLORIDE 3 MG: 1 INJECTION, SOLUTION INTRAMUSCULAR; INTRAVENOUS at 08:13

## 2025-04-01 RX ADMIN — SODIUM CHLORIDE, SODIUM LACTATE, POTASSIUM CHLORIDE, AND CALCIUM CHLORIDE: 600; 310; 30; 20 INJECTION, SOLUTION INTRAVENOUS at 10:02

## 2025-04-01 RX ADMIN — DEXAMETHASONE SODIUM PHOSPHATE 4 MG: 4 INJECTION INTRA-ARTICULAR; INTRALESIONAL; INTRAMUSCULAR; INTRAVENOUS; SOFT TISSUE at 08:12

## 2025-04-01 RX ADMIN — PROPOFOL 50 MG: 10 INJECTION, EMULSION INTRAVENOUS at 09:42

## 2025-04-01 RX ADMIN — ACETAMINOPHEN 1000 MG: 500 TABLET, FILM COATED ORAL at 07:54

## 2025-04-01 RX ADMIN — PROPOFOL 100 MG: 10 INJECTION, EMULSION INTRAVENOUS at 09:41

## 2025-04-01 RX ADMIN — Medication 20 MG: at 09:46

## 2025-04-01 RX ADMIN — SODIUM CHLORIDE, SODIUM LACTATE, POTASSIUM CHLORIDE, AND CALCIUM CHLORIDE: 600; 310; 30; 20 INJECTION, SOLUTION INTRAVENOUS at 07:55

## 2025-04-01 RX ADMIN — Medication 2000 MG: at 09:48

## 2025-04-01 RX ADMIN — PHENYLEPHRINE HYDROCHLORIDE 150 MCG: 0.1 INJECTION, SOLUTION INTRAVENOUS at 10:52

## 2025-04-01 RX ADMIN — HYDROMORPHONE HYDROCHLORIDE 0.5 MG: 1 INJECTION, SOLUTION INTRAMUSCULAR; INTRAVENOUS; SUBCUTANEOUS at 11:51

## 2025-04-01 RX ADMIN — PHENYLEPHRINE HYDROCHLORIDE 100 MCG: 0.1 INJECTION, SOLUTION INTRAVENOUS at 09:51

## 2025-04-01 RX ADMIN — HYDROMORPHONE HYDROCHLORIDE 0.5 MG: 1 INJECTION, SOLUTION INTRAMUSCULAR; INTRAVENOUS; SUBCUTANEOUS at 12:05

## 2025-04-01 ASSESSMENT — PAIN DESCRIPTION - ONSET: ONSET: ON-GOING

## 2025-04-01 ASSESSMENT — PAIN SCALES - GENERAL
PAINLEVEL_OUTOF10: 7
PAINLEVEL_OUTOF10: 2
PAINLEVEL_OUTOF10: 9
PAINLEVEL_OUTOF10: 5
PAINLEVEL_OUTOF10: 7
PAINLEVEL_OUTOF10: 8
PAINLEVEL_OUTOF10: 3
PAINLEVEL_OUTOF10: 5
PAINLEVEL_OUTOF10: 7

## 2025-04-01 ASSESSMENT — PAIN DESCRIPTION - LOCATION
LOCATION: KNEE

## 2025-04-01 ASSESSMENT — PAIN DESCRIPTION - ORIENTATION
ORIENTATION: RIGHT

## 2025-04-01 ASSESSMENT — PAIN DESCRIPTION - FREQUENCY: FREQUENCY: CONTINUOUS

## 2025-04-01 ASSESSMENT — PAIN DESCRIPTION - DESCRIPTORS
DESCRIPTORS: ACHING
DESCRIPTORS: BURNING
DESCRIPTORS: ACHING

## 2025-04-01 ASSESSMENT — PAIN SCALES - WONG BAKER: WONGBAKER_NUMERICALRESPONSE: HURTS A LITTLE BIT

## 2025-04-01 ASSESSMENT — COPD QUESTIONNAIRES: CAT_SEVERITY: MILD

## 2025-04-01 ASSESSMENT — PAIN DESCRIPTION - PAIN TYPE: TYPE: SURGICAL PAIN

## 2025-04-01 NOTE — PROGRESS NOTES
4 Eyes Skin Assessment     NAME:  Yazan Lorenzo  YOB: 1965  MEDICAL RECORD NUMBER:  [id number]    The patient is being assessed for  Post-Op Surgical    I agree that at least one RN has performed a thorough Head to Toe Skin Assessment on the patient. ALL assessment sites listed below have been assessed.      Areas assessed by both nurses:    Head, Face, Ears, Shoulders, Back, Chest, Arms, Elbows, Hands, Sacrum. Buttock, Coccyx, Ischium, Legs. Feet and Heels, and Under Medical Devices         Does the Patient have a Wound? No noted wound(s)       Chet Prevention initiated by RN: Yes  Wound Care Orders initiated by RN: No    Pressure Injury (Stage 3,4, Unstageable, DTI, NWPT, and Complex wounds) if present, place Wound referral order by RN under : No    New Ostomies, if present place, Ostomy referral order under : No     Nurse 1 eSignature: Electronically signed by KENAN NGUYEN RN on 4/1/25 at 12:55 PM EDT    **SHARE this note so that the co-signing nurse can place an eSignature**    Nurse 2 eSignature: Electronically signed by Rosaura Melissa RN on 4/1/25 at 2:22 PM EDT

## 2025-04-01 NOTE — OP NOTE
Operative Note      Patient: Yazan Lorenzo  YOB: 1965  MRN: [id number]    Date of Procedure: 4/1/2025    Pre-Op Diagnosis Codes:      * Primary osteoarthritis of right knee [M17.11]    Post-Op Diagnosis: Same       Procedure(s):  RIGHT-KNEE TOTAL ARTHROPLASTY-SDD    Surgeon(s):  Kirt Kern MD    Assistant:   First Assistant: Roseann Covington    Anesthesia: Spinal    Estimated Blood Loss (mL): less than 50     Complications: None    Specimens:   * No specimens in log *    Implants:  Implant Name Type Inv. Item Serial No.  Lot No. LRB No. Used Action   ATTUNE FEM POR CR RT SZ 8 - [id number]  ATTUNE FEM POR CR RT SZ 8  [employer]- [id number] Right 1 Implanted   BEARING TIB FIX 7 KNEE BASE POROUS ATTUNE AFFIXIUM - [id number]  BEARING TIB FIX 7 KNEE BASE POROUS ATTUNE AFFIXIUM  [facility] [employer]-WD [id number] Right 1 Implanted   INSERT TIB FIX BEAR 8 5 MM RT MEDL KNEE STBL AOX ATTUNE - [id number]  INSERT TIB FIX BEAR 8 5 MM RT MEDL KNEE STBL AOX ATTUNE  J [employer]-WD [id number] Right 1 Implanted         Drains: * No LDAs found *    Findings:  Infection Present At Time Of Surgery (PATOS) (choose all levels that have infection present):  No infection present  Other Findings: Significant flexion contracture >30 degrees with valgus deformity    Detailed Description of Procedure:        Indications for procedure:  The patient is a 59 y.o. male  with radiographic evidence of known end stage osteoarthritis of their knee and failure of conservative management, including but not limited to pain relievers, corticosteroids, home exercise program.  He also had a significant flexion contracture greater than 30 degrees with a varus deformity that was fixed.  Based on the clinical history, physical exam findings, radiographs and failure of conservative management, the patient met the criteria for total knee arthroplasty. We discussed continued

## 2025-04-01 NOTE — PROGRESS NOTES
-- Dary Euceda (Self) [phone] (X)          Alternative phone number: [phone]    Turnaround time given to caller:   \"This message will be sent to [state Provider's name]. The clinical team will fulfill your request as soon as they review your message.\"     OCCUPATIONAL THERAPY Initial Assessment, Daily Note, and PM      (Link to Caseload Tracking: OT Visit Days: 1  OT Orders   Time  OT Charge Capture  Rehab Caseload Tracker  Episode     Yazan Lorenzo is a 59 y.o. male   PRIMARY DIAGNOSIS: Primary osteoarthritis of right knee  Primary osteoarthritis of right knee [M17.11]  Arthritis of right knee [M17.11]  Procedure(s) (LRB):  RIGHT-KNEE TOTAL ARTHROPLASTY-SDD (Right)  * Day of Surgery *  Reason for Referral: Pain in Right Knee (M25.561)  Stiffness of Right Knee, Not elsewhere classified (M25.661)  Outpatient in a bed: Payor: [employer] MEDICARE / Plan: [employer] CHOICE-PPO MEDICARE / Product Type: *No Product type* /     ASSESSMENT:     REHAB RECOMMENDATIONS:   Recommendation to date pending progress:  Setting:  No further skilled occupational therapy after discharge from hospital    Equipment:    None     ASSESSMENT:  Mr. Lorenzo is s/p right TKA and presents with decreased independence with functional mobility and activities of daily living as compared to baseline level of function and safety. Patient would benefit from skilled Occupational Therapy to maximize independence and safety with self-care task and functional mobility. Pt is hard of hearing. He had L TKA in June 2023 at [facility].   Patient able to complete  lower body dressing and upper body dressing at edge of bed with minimal assist .  Mobilized from hospital bed  around room to recliner  using a rolling walker with contact guard assist. OT educated pt on bathing safety/hygiene, use of cryocuff, resting joint position, compensatory strategies for LB ADLs, pt verbalized understanding. Patient is hopeful to return home day of surgery       [facility] AM-PAC™ “6 Clicks” Daily Activity Inpatient Short Form:     AM-PAC Daily Activity - Inpatient   How much help is needed for putting on and taking off regular lower body clothing?: A Little  How much help is needed for bathing (which includes washing, rinsing,  Patient : Scott Mckeon Sr. Age: 47 year old Sex: male   MRN: [id number] Encounter Date: 3/9/2018    SD18/18    History     Chief Complaint   Patient presents with   • Chest Pain Adult     HPI  3/9/2018  4:06 PM Scott Mckeon Sr. is a 47 year old male with a h/o chronic back pain and stage IV lung CA who presents to the ED via private vehicle c/o CP that began last night. The pt frequently experiences a \"shooting\" CP, but yesterday his CP worsened and \"grabbed [him] and really got [his attention].\" This concerned him and he presented to [place] for evaluation. He received a CXR and EKG, and was admitted for further evaluation. However, he \"released himself\" from [facility] this morning in order to attend his final radiation tx today. He was able to attend his radiation tx, but presents now with persisting CP. Scott notes his CP is worse with coughing. He denies any pertinent cardiac hx. Pt reports his chronic back pain is tolerable at this time after taking his routine pain medications (15mg Morphine BID and 10mg Percocet for breakthrough pain). His oncologist is Dr. Manning, who also prescribed his chronic pain medications. He reports a h/o stroke, but denies any h/o DVT. There are no other alleviating or modifying factors at this time.     4:33 PM Chart Review: I reviewed the pt's medications, allergies, and past medical and surgical history in Epic. Reviewed admission from 3/4/2018-3/6/2018. Reviewed visit from earlier today, including CTA Chest PE Impression: 1. No pulmonary emboli. 2. Worsening mediastinal and left hilar lymphadenopathy. Left infrahilar lymphadenopathy is now obstructing the left lower lobe bronchus with subsequent postobstructive atelectasis    PCP: Bette Giron MD    No Known Allergies    Prior to Admission Medications    ALBUTEROL 108 (90 BASE) MCG/ACT INHALER    Inhale 2 puffs into the lungs every 4 hours as needed for Shortness of Breath.    APIXABAN (ELIQUIS) 5 MG TAB    Take  1 tablet by mouth every 12 hours.    ASPIRIN 81 MG TABLET    Take 81 mg by mouth daily.    ATORVASTATIN (LIPITOR) 80 MG TABLET    Take 1 tablet by mouth nightly.    BACLOFEN (LIORESAL) 10 MG TABLET    Take 1 tablet by mouth 3 times daily.    CARVEDILOL (COREG) 3.125 MG TABLET    Take 1 tablet by mouth 2 times daily (with meals).    CUSTOM MAGIC MOUTHWASH ORAL SUSPENSION    Take 5mL by mouth every 4 hours as needed for mouth pain.    DOCUSATE SODIUM-SENNOSIDES (SENOKOT S) 50-8.6 MG PER TABLET    Take 2 tablets by mouth daily as needed for Constipation.    GABAPENTIN (NEURONTIN) 300 MG CAPSULE    Take 2 capsules by mouth 3 times daily.    IBUPROFEN (MOTRIN) 600 MG TABLET    Take 1 tablet by mouth 3 times a day after meals.    LEVETIRACETAM (KEPPRA) 500 MG TABLET    Take 1 tablet by mouth every 12 hours.    LIDOCAINE (LIDODERM) 5 % PATCH    Place 1 patch onto the skin every 24 hours. Remove patch 12 hours after applying    MORPHINE SR (MS CONTIN) 15 MG 12 HR TABLET    Take 1 tablet by mouth in morning and 2 tablets by mouth in the evening    NYSTATIN (MYCOSTATIN) 393607 UNIT/ML SUSPENSION    Swish and swallow 5 mLs by mouth 4 times daily.    OMEPRAZOLE (PRILOSEC) 20 MG CAPSULE    Take 1 capsule by mouth daily.    OXYCODONE/APAP (PERCOCET)  MG PER TABLET    Take 1 tablet by mouth every 6 hours as needed for Pain.    POLYETHYLENE GLYCOL (MIRALAX) POWDER    Take 17 g by mouth daily. Hold for loose bowel movements or diarrhea.    PROCHLORPERAZINE (COMPAZINE) 10 MG TABLET    Take 1 tablet by mouth every 6 hours as needed for Nausea or Vomiting.    VITAMIN D, ERGOCALCIFEROL, 35751 UNITS CAPSULE    Take 1 capsule by mouth once a week.       Past Medical History:   Diagnosis Date   • History of ischemic multifocal multiple vascular territories stroke 12/08/2017    12-8-17 mainly R PCA R parietal MCA branch other scattered L cerebellum L MCA felt hypercoagulable also PFO   • Hx of ischemic left MCA stroke 02/20/2018    L  M1 embolus from hypercoag state s/p embolectomy   • Kidney stone    • Non-small cell cancer of left lung (CMS/HCC) 11/01/2017   • PFO (patent foramen ovale) with bidirectional atrial shunt 12/19/2017       Past Surgical History:   Procedure Laterality Date   • CYBERKNIFE 3D CONTOURING  11/29/2017   • FRACTURE SURGERY Left 2000    plate placed       Family History   Problem Relation Age of Onset   • Aneurysm Mother    • Hypertension Mother    • Hypertension Father    • Cancer, Breast Sister    • Myocardial Infarction Brother    • Hypertension Brother    • Heart disease Brother    • Hypertension Brother    • Other Brother      Cyst on lung       Social History   Substance Use Topics   • Smoking status: Former Smoker     Packs/day: 1.00     Years: 30.00     Types: Cigarettes     Quit date: 10/23/2017   • Smokeless tobacco: Never Used   • Alcohol use No       Review of Systems   Constitutional: Negative for chills and fever.   HENT: Negative for congestion and sore throat.    Eyes: Negative for visual disturbance.   Respiratory: Negative for cough and shortness of breath.    Cardiovascular: Positive for chest pain (\"grabbing\" pain). Negative for leg swelling.   Gastrointestinal: Negative for abdominal pain, diarrhea, nausea and vomiting.   Genitourinary: Negative for dysuria and hematuria.   Skin: Negative for rash.   Neurological: Negative for headaches.   Hematological: Does not bruise/bleed easily.       Physical Exam     ED Triage Vitals [03/09/18 1537]   ED Triage Vitals Group      Temp 97.6 °F (36.4 °C)      Pulse 95      Resp 17      /80      SpO2 99 %      EtCO2 mmHg       Height 5' 10\" (1.778 m)      Weight 131 lb 6.3 oz (59.6 kg)      Weight Scale Used ED Actual       Physical Exam   Constitutional: He appears well-developed and well-nourished.   HENT:   Head: Atraumatic.   Eyes: EOM are normal. Pupils are equal, round, and reactive to light.   Neck: Normal range of motion.   Cardiovascular: Normal rate  and regular rhythm.    Distal pulses intact.   Pulmonary/Chest: Breath sounds normal. No respiratory distress. He has no wheezes.   Abdominal: Soft. There is no tenderness.   Musculoskeletal: He exhibits no edema (no peripheral edema).   Equal calf size bilaterally.   Neurological: He is alert.   Skin: Skin is warm and dry.   Psychiatric: He has a normal mood and affect.   Nursing note and vitals reviewed.      ED Course     Procedures    Lab Results     Results for orders placed or performed during the hospital encounter of 03/09/18   CBC & Auto Differential   Result Value Ref Range    WBC 7.8 4.2 - 11.0 K/mcL    RBC 3.60 (L) 4.50 - 5.90 mil/mcL    HGB 8.9 (L) 13.0 - 17.0 g/dL    HCT 26.1 (L) 39.0 - 51.0 %    MCV 72.5 (L) 78.0 - 100.0 fl    MCH 24.7 (L) 26.0 - 34.0 pg    MCHC 34.1 32.0 - 36.5 g/dL    RDW-CV 21.7 (H) 11.0 - 15.0 %     (L) 140 - 450 K/mcL    DIFF TYPE MANUAL DIFFERENTIAL     SEG 88 %    BAND 1 0 - 10 %    LYMPH 5 %    MONO 6 %    EOS 0 %    BASO 0 %    Absolute Neut 6.9 1.8 - 7.7 K/mcL    Absolute Lymph 0.4 (L) 1.0 - 4.8 K/mcL    Absolute Mono 0.5 0.3 - 0.9 K/mcL    Absolute Eos 0.0 (L) 0.1 - 0.5 K/mcL    Absolute Baso 0.0 0.0 - 0.3 K/mcL    WBC MORPHOLOGY NORMAL NORMAL    PLATELETS APPEAR NORMAL NORMAL    Microcytosis FEW     Polychromasia FEW     Target Cells FEW     Acanthocytes FEW     Shistocytes MODERATE    Prothrombin Time   Result Value Ref Range    PROTIME 13.3 (H) 9.7 - 11.8 sec    INR 1.4    Comprehensive Metabolic Panel   Result Value Ref Range    Sodium 136 135 - 145 mmol/L    Potassium 4.1 3.4 - 5.1 mmol/L    Chloride 99 98 - 107 mmol/L    Carbon Dioxide 28 21 - 32 mmol/L    Anion Gap 13 10 - 20 mmol/L    Glucose 91 65 - 99 mg/dL    BUN 22 (H) 6 - 20 mg/dL    Creatinine 0.91 0.67 - 1.17 mg/dL    GFR Estimate,  >90     GFR Estimate, Non African American >90     BUN/Creatinine Ratio 24 7 - 25    CALCIUM 8.6 8.4 - 10.2 mg/dL    TOTAL BILIRUBIN 0.3 0.2 - 1.0 mg/dL     AST/SGOT 27 <38 Units/L    ALT/SGPT 20 <79 Units/L    ALK PHOSPHATASE 124 (H) 45 - 117 Units/L    TOTAL PROTEIN 7.4 6.4 - 8.2 g/dL    Albumin 2.6 (L) 3.6 - 5.1 g/dL    GLOBULIN 4.8 (H) 2.0 - 4.0 g/dL    A/G Ratio, Serum 0.5 (L) 1.0 - 2.4   B Type Natriuretic Peptide BNP   Result Value Ref Range    B-TYPE NATRIURETIC PEPTIDE 60 <100 pg/mL   Troponin I Ultra Sensitive   Result Value Ref Range    TROPONIN I 0.10 (HH) <0.05 ng/mL   Chem 8 Panel - Point of Care   Result Value Ref Range    Sodium  135 - 145 mmol/L    Potassium POC 4.0 3.4 - 5.1 mmol/L    Chloride POC 96 (L) 98 - 107 mmol/L    CALCIUM IONIZED-POC 1.14 (L) 1.15 - 1.29 mmol/L    CO2 Total 29 (H) 19 - 24 mmol/L    GLUCOSE POC 99 65 - 99 mg/dL    BUN POC 22 (H) 6 - 20 mg/dL    HEMATOCRIT POC 30.0 (L) 39.0 - 51.0 %    Hemoglobin POC 10.2 (L) 13.0 - 17.0 g/dL    ANION GAP POC 16 mmol/L    Creatinine POC 0.90 0.67 - 1.17 mg/dL    Estimated GFR  (POC) >90     Estimated GFR Non- (POC) >90    Troponin I - Point of Care   Result Value Ref Range    Troponin I POC <0.10 <0.10 ng/mL       EKG Results     EKG Interpretation  Rate: 96  Rhythm: normal sinus rhythm   Abnormality: NS T wave changes. Stable from prior.     EKG interpreted by ED physician    Radiology Results     Imaging Results          XR Chest AP or PA (In process)                 ED Medication Orders     Start Ordered     Status Ordering Provider    03/09/18 1724 03/09/18 1723  oxyCODONE/APAP (PERCOCET) 5-325 MG tablet 2 tablet  ONCE      Last MAR action:  Given GEORGIE RENDON    03/09/18 1642 03/09/18 1642  sodium chloride (PF) 0.9 % injection 2 mL  (Capped IV)  ONCE      Last MAR action:  Given GEORGIE RENDON    03/09/18 1642 03/09/18 1642  albuterol-ipratropium 2.5 mg/0.5 mg (DUONEB) nebulizer solution 3 mL  ONCE      Last MAR action:  Given by GEORGIE Menezes    03/09/18 1642 03/09/18 1642  ketorolac injection 15 mg  ONCE      Last MAR action:  Given  GEORGIE RENDON    03/09/18 1640 03/09/18 1642  sodium chloride (PF) 0.9 % injection 2 mL  (Capped IV)  PRN      Acknowledged GEORGIE RENDON          [facility]  Vitals  Vitals:    03/09/18 1630 03/09/18 1700 03/09/18 1730 03/09/18 1800   BP: 118/72 120/78 111/74 112/69   Pulse: 96 93 93 97   Resp: 18 19 18 13   Temp:       TempSrc:       SpO2: 97% 95% 98% 100%   Weight:       Height:           ED Course    5:23 PM Ultra sensitive Troponin is 0.10.     5:23 PM Nurse Update: Pt is requesting is normal 4:00 PM dose of Percocet. Toradol has been given.     5:31 PM I spoke with Dr. Duke (cardiologist) regarding the patient's medical hx, presentation and the ED work up. I discussed pt's visit early this morning at [facility], including negative CTA chest for PE and troponin of 0.14. I informed his Ultra Sensitive is 0.10 now. We further discussed and collaboratively agreed upon plan of care via COU.     5:50 PM Pt Recheck: I rechecked on the patient who is resting back in bed with his daughter at bedside. I updated the patient on his EKG, which appears stable. I discussed his trending Troponins from today, and explained his most recent is 0.10. I offered further evaluation with cardiology consult. Pt is agreeable to care beyond the ED.     5:59 PM I spoke with the CP fellow regarding the patient's presentation and the ED work up. I discussed my consult with Dr. Duke. We further discussed and collaboratively agreed upon plan of care.       Critical Care time spent on this patient outside of billable procedures:  None    Admit  Clinical Impression:  ED Diagnoses        Final diagnoses    Chest pain, unspecified type     Elevated troponin               Pt to be admitted to Dr. Duke in stable condition.       I have reviewed the information recorded by the scribe for accuracy and agree with its contents.    ____________________________________________________________________    Jody Pandey acting as a scribe for   Angel Burnett  Dictation # [id number]  Scribe: Jody Burnett MD  03/09/18 2543

## 2025-04-01 NOTE — ANESTHESIA POSTPROCEDURE EVALUATION
Department of Anesthesiology  Postprocedure Note    Patient: Yazan Lorenzo  MRN: [id number]  YOB: 1965  Date of evaluation: 4/1/2025    Procedure Summary       Date: 04/01/25 Room / Location: [facility] MAIN OR  / [facility] MAIN OR    Anesthesia Start: 0926 Anesthesia Stop: 1136    Procedure: RIGHT-KNEE TOTAL ARTHROPLASTY-SDD (Right: Knee) Diagnosis:       Primary osteoarthritis of right knee      (Primary osteoarthritis of right knee [M17.11])    Surgeons: Kirt Kern MD Responsible Provider: William Vizcarra Jr., MD    Anesthesia Type: Spinal, TIVA ASA Status: 3            Anesthesia Type: Spinal, TIVA    Jeff Phase I: Jeff Score: 10    Jeff Phase II:      Anesthesia Post Evaluation    Patient location during evaluation: PACU  Patient participation: complete - patient participated  Level of consciousness: awake  Pain score: 0  Airway patency: patent  Nausea & Vomiting: no nausea and no vomiting  Cardiovascular status: blood pressure returned to baseline and hemodynamically stable  Respiratory status: acceptable, spontaneous ventilation and nonlabored ventilation  Hydration status: euvolemic  Multimodal analgesia pain management approach  Pain management: adequate    No notable events documented.

## 2025-04-01 NOTE — DISCHARGE INSTRUCTIONS
does not get better after you take pain medicine.   Watch closely for changes in your health, and be sure to contact your doctor if:    You do not have a bowel movement after taking a laxative.   Where can you learn more?  Go to https://www.[employer].net/patientEd and enter T054 to learn more about \"Total Knee Replacement: What to Expect at Home.\"  Current as of: July 31, 2024  Content Version: 14.4  © 2024-2025 [employer].   Care instructions adapted under license by [employer]. If you have questions about a medical condition or this instruction, always ask your healthcare professional. [employer] disclaims any warranty or liability for your use of this information.      Information obtained by :  I understand that if any problems occur once I am at home I am to contact my physician.    I understand and acknowledge receipt of the instructions indicated above.                                                                                                                                           Physician's or R.N.'s Signature                                                                  Date/Time                                                                                                                                              Patient or Representative Signature                                                          Date/Time

## 2025-04-01 NOTE — PROGRESS NOTES
ACUTE PHYSICAL THERAPY GOALS:   (Developed with and agreed upon by patient and/or caregiver.)  GOALS (1-4 days):  (1.)Mr. Lorenzo will move from supine to sit and sit to supine  in bed with SUPERVISION.  (2.)Mr. Lorenzo will transfer from bed to chair and chair to bed with SUPERVISION using the least restrictive device.  (3.)Mr. Lorenzo will ambulate with SUPERVISION for 300 feet with the least restrictive device.  (4.)Mr. Lorenzo will ambulate up/down 2 steps with left railing with CONTACT GUARD ASSIST.  (5.)Mr. Lorenzo will increase right knee ROM to 0-90°.  ________________________________________________________________________________________________           PHYSICAL THERAPY: TOTAL KNEE ARTHROPLASTY Initial Assessment and PM  (Link to Caseload Tracking: PT Visit Days : 1  Acknowledge Orders  Time In/Out  PT Charge Capture  Rehab Caseload Tracker  Episode   Yazan Lorenzo is a 59 y.o. male   PRIMARY DIAGNOSIS: Primary osteoarthritis of right knee  Primary osteoarthritis of right knee [M17.11]  Arthritis of right knee [M17.11]  Procedure(s) (LRB):  RIGHT-KNEE TOTAL ARTHROPLASTY-SDD (Right)  * Day of Surgery *  Reason for Referral: Pain in Right Knee (M25.561)  Stiffness of Right Knee, Not elsewhere classified (M25.661)  Difficulty in walking, Not elsewhere classified (R26.2)  Outpatient in a bed: Payor: [employer] MEDICARE / Plan: [employer] CHOICE-PPO MEDICARE / Product Type: *No Product type* /     REHAB RECOMMENDATIONS:   Recommendation to date pending progress:  Setting:  Home Health Therapy    Equipment:    Rolling Walker     RANGE OF MOTION:   Right Knee Flexion: R Knee Flexion (0-145): 80 (approximate)  Right Knee Extension: R Knee Extension (0): 20 (approximate)     GAIT: I Mod I S SBA CGA Min Mod Max Total  NT x2 Comments:   Level of Assistance [] [] [] [x] [] [] [] [] [] [] []            Weightbearing Status  Full weight bearing     Distance  315 feet    Gait Quality Antalgic, Decreased kaushal , Decreased step

## 2025-04-01 NOTE — ANESTHESIA PRE PROCEDURE
Allergen Reactions   • Penicillins Anaphylaxis   • Tizanidine Other (See Comments)     BP pressure low -       Problem List:    Patient Active Problem List   Diagnosis Code   • Centrilobular emphysema (HCC) J43.2   • Essential hypertension I10   • Impaired fasting blood sugar R73.01   • Obesity, morbid E66.01   • Current mild episode of major depressive disorder without prior episode F32.0   • Peripheral edema R60.0   • Status post bariatric surgery Z98.84   • Chronic idiopathic gout involving toe of right foot without tophus M1A.0710   • Benign prostatic hyperplasia with urinary retention N40.1, R33.8   • Primary osteoarthritis of right knee M17.11   • Arthritis of right knee M17.11       Past Medical History:        Diagnosis Date   • Centrilobular emphysema (HCC) 8/23/2018   • Chronic idiopathic gout 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 Patient has stopped drinking for a few months. Referral to Gastroenterology for further evaluation has been delayed until July.   • Exertional dyspnea    • H/O gastric sleeve    • History of alcohol abuse    • History of arthritis    • History of asthma    • History of CHF (congestive heart failure)    • History of iron deficiency anemia    • History of peripheral edema    • History of tinea    • Hypertension    • Impaired fasting glucose 8/23/2018   • Knee pain     osteoarthritis of both knees   • Low energy    • Memory difficulty    • Morbid obesity     BMI:35   • Musculoskeletal disorder    • Nervousness    • MICHELLE (obstructive sleep apnea)     no longer required due to weight loss   • SOB (shortness of breath)     3.10.25 pt denies any recent SOB or CP   • Wears glasses     to read   • Wheezing     3.10.25- no wheezing at present

## 2025-04-01 NOTE — PROGRESS NOTES
TRANSFER - IN REPORT:    Verbal report received from Katherine hunt Yazan Lorenzo  being received from PACU for routine post-op      Report consisted of patient's Situation, Background, Assessment and   Recommendations(SBAR).     Information from the following report(s) Nurse Handoff Report was reviewed with the receiving nurse.    Opportunity for questions and clarification was provided.      Assessment completed upon patient's arrival to unit and care assumed.

## 2025-04-01 NOTE — ANESTHESIA PROCEDURE NOTES
Spinal Block    Patient location during procedure: OR  End time: 4/1/2025 9:40 AM  Reason for block: primary anesthetic  Staffing  Performed: anesthesiologist   Anesthesiologist: William Vizcarra Jr., MD  Performed by: William Vizcarra Jr., MD  Authorized by: William Vizcarra Jr., MD    Spinal Block  Patient position: sitting  Prep: ChloraPrep  Patient monitoring: cardiac monitor, continuous pulse ox, frequent blood pressure checks and oxygen  Approach: midline  Location: L2/L3  Provider prep: mask and sterile gloves  Local infiltration: lidocaine  Needle  Needle type: Quincke   Needle gauge: 22 G  Needle length: 4 in  Needle insertion depth: 3 cm  Assessment  Sensory level: T6  Events: cerebrospinal fluid  Swirl obtained: Yes  CSF: clear  Attempts: 1  Hemodynamics: stable  Preanesthetic Checklist  Completed: patient identified, IV checked, site marked, risks and benefits discussed, surgical/procedural consents, equipment checked, pre-op evaluation, timeout performed, anesthesia consent given, oxygen available, monitors applied/VS acknowledged, fire risk safety assessment completed and verbalized and blood product R/B/A discussed and consented          
(DECADRON) injection 4 mg/mL - Perineural   4 mg - 4/1/2025 8:12:00 AM  ROPivacaine 0.2% with EPINEPHrine 1:200,000 injection (ANESTH) (Mixture components: EPINEPHrine PF 1 MG/ML Soln, 0.005 mL; ROPivacaine 0.2% Soln, 0.1 mL) - Perineural   20 mL - 4/1/2025 8:12:00 AM

## 2025-04-01 NOTE — PROGRESS NOTES
Pt received to Rm 312. Pt alert and oriented. Oriented pt to room and bed controls. Family at bedside.

## 2025-04-01 NOTE — CARE COORDINATION
RNCM met with patient in room 312 to discuss discharge planning.   Patient is a 59 year old male admitted with right TKA.    Patient presents to assessment alert and oriented, and answers questions appropriately.  Patient typically lives at home with spouse. Patient denies equipment needs at this time. Patient reports a rollator, rolling walker, and cane in the home.     PT/OT evaluations pending. Patient as selected [facility] by [facility] for any needed home health services. CM will continue to follow until discharge. Please notify of any changes.          04/01/25 1320   Service Assessment   Patient Orientation Alert and Oriented;Person;Place;Situation;Self   Cognition Alert   History Provided By Patient   Primary Caregiver Self   Accompanied By/Relationship Agnes Barragan, spouse [phone]   Support Systems Spouse/Significant Other   Patient's Healthcare Decision Maker is: Legal Next of Kin  (Agnes Barragan, spouse [phone])   PCP Verified by CM Yes   Last Visit to PCP Within last 3 months   Prior Functional Level Independent in ADLs/IADLs   Current Functional Level Assistance with the following:;Housework;Mobility;Shopping   Can patient return to prior living arrangement Yes   Ability to make needs known: Good   Family able to assist with home care needs: Yes   Would you like for me to discuss the discharge plan with any other family members/significant others, and if so, who? Yes  (spouse)   Financial Resources Medicare;Medicaid   Community Resources None   CM/SW Referral Other (see comment)  (discharge planning)   Social/Functional History   Lives With Spouse   Type of Home House   Home Layout One level   Home Access Level entry   Bathroom Shower/Tub Tub/Shower unit   Bathroom Equipment Grab bars in shower;Shower chair   Home Equipment Rollator;Walker - Rolling;Cane   Occupation On disability   Discharge Planning   Type of Residence House   Living Arrangements Spouse/Significant Other

## 2025-04-02 ENCOUNTER — TELEPHONE (OUTPATIENT)
Dept: ORTHOPEDIC SURGERY | Age: 60
End: 2025-04-02

## 2025-04-02 DIAGNOSIS — Z96.651 S/P TKR (TOTAL KNEE REPLACEMENT), RIGHT: Primary | ICD-10-CM

## 2025-04-02 NOTE — TELEPHONE ENCOUNTER
Call pt  please  he had  surgery   yesterday and speak  with him about  his  bandage  and Home  Health   He  has spoken to Alexa  with  [facility]  but needs to speak  with  you

## 2025-04-02 NOTE — TELEPHONE ENCOUNTER
Spoke with patient who states that he was told that he needed a referral for home health in order for them to come out. I informed the patient to contact me if he hasn't heard from anyone by Monday. A referral was placed to [facility].  Patient verbalized understanding and voiced no other concerns at this time.

## 2025-04-07 ENCOUNTER — TELEPHONE (OUTPATIENT)
Dept: ORTHOPEDIC SURGERY | Age: 60
End: 2025-04-07

## 2025-04-07 RX ORDER — OXYCODONE HYDROCHLORIDE 5 MG/1
5 TABLET ORAL EVERY 4 HOURS PRN
Qty: 30 TABLET | Refills: 0 | Status: CANCELLED | OUTPATIENT
Start: 2025-04-07 | End: 2025-04-14

## 2025-04-08 DIAGNOSIS — Z96.651 S/P TOTAL KNEE ARTHROPLASTY, RIGHT: Primary | ICD-10-CM

## 2025-04-08 RX ORDER — OXYCODONE HYDROCHLORIDE 5 MG/1
5 TABLET ORAL EVERY 4 HOURS PRN
Qty: 30 TABLET | Refills: 0 | Status: SHIPPED | OUTPATIENT
Start: 2025-04-08 | End: 2025-04-15

## 2025-04-11 ENCOUNTER — TELEPHONE (OUTPATIENT)
Dept: ORTHOPEDIC SURGERY | Age: 60
End: 2025-04-11

## 2025-04-11 DIAGNOSIS — Z96.651 S/P TOTAL KNEE REPLACEMENT, RIGHT: Primary | ICD-10-CM

## 2025-04-11 NOTE — TELEPHONE ENCOUNTER
Glenda is calling to report they are discharging him on the 24th and will start at [facility] on the 28th. Please put the order in Epic.

## 2025-04-14 ENCOUNTER — TELEPHONE (OUTPATIENT)
Dept: ORTHOPEDIC SURGERY | Age: 60
End: 2025-04-14

## 2025-04-14 NOTE — TELEPHONE ENCOUNTER
Returned patient's call about swelling, Patient stated that the Physical Therapist noticed the swelling. Instructed the patient to elevate above the heart, apply ice as needed, and keep taking the anti-inflammatory. Patient voiced understanding, and had no concerns.

## 2025-04-17 ENCOUNTER — TELEPHONE (OUTPATIENT)
Dept: ORTHOPEDIC SURGERY | Age: 60
End: 2025-04-17

## 2025-04-17 NOTE — TELEPHONE ENCOUNTER
Spoke with Daina and gave verbal orders for dressing change.  Daina verbalized understanding and voiced no other concerns at this time.

## 2025-04-19 ASSESSMENT — PROMIS GLOBAL HEALTH SCALE
SUM OF RESPONSES TO QUESTIONS 3, 6, 7, & 8: 14
IN THE PAST 7 DAYS, HOW WOULD YOU RATE YOUR FATIGUE ON AVERAGE [ON A SCALE FROM 1 (NONE) TO 5 (VERY SEVERE)]?: MODERATE
WHO IS THE PERSON COMPLETING THE PROMIS V1.1 SURVEY?: SELF
IN GENERAL, HOW WOULD YOU RATE YOUR PHYSICAL HEALTH [ON A SCALE OF 1 (POOR) TO 5 (EXCELLENT)]?: FAIR
IN GENERAL, PLEASE RATE HOW WELL YOU CARRY OUT YOUR USUAL SOCIAL ACTIVITIES (INCLUDES ACTIVITIES AT HOME, AT WORK, AND IN YOUR COMMUNITY, AND RESPONSIBILITIES AS A PARENT, CHILD, SPOUSE, EMPLOYEE, FRIEND, ETC) [ON A SCALE OF 1 (POOR) TO 5 (EXCELLENT)]?: FAIR
IN GENERAL, WOULD YOU SAY YOUR QUALITY OF LIFE IS...[ON A SCALE OF 1 (POOR) TO 5 (EXCELLENT)]: FAIR
IN THE PAST 7 DAYS, HOW WOULD YOU RATE YOUR PAIN ON AVERAGE [ON A SCALE FROM 0 (NO PAIN) TO 10 (WORST IMAGINABLE PAIN)]?: 7
SUM OF RESPONSES TO QUESTIONS 2, 4, 5, & 10: 9
HOW IS THE PROMIS V1.1 BEING ADMINISTERED?: ELECTRONIC
TO WHAT EXTENT ARE YOU ABLE TO CARRY OUT YOUR EVERYDAY PHYSICAL ACTIVITIES SUCH AS WALKING, CLIMBING STAIRS, CARRYING GROCERIES, OR MOVING A CHAIR [ON A SCALE OF 1 (NOT AT ALL) TO 5 (COMPLETELY)]?: A LITTLE
IN THE PAST 7 DAYS, HOW OFTEN HAVE YOU BEEN BOTHERED BY EMOTIONAL PROBLEMS, SUCH AS FEELING ANXIOUS, DEPRESSED, OR IRRITABLE [ON A SCALE FROM 1 (NEVER) TO 5 (ALWAYS)]?: OFTEN
IN GENERAL, HOW WOULD YOU RATE YOUR SATISFACTION WITH YOUR SOCIAL ACTIVITIES AND RELATIONSHIPS [ON A SCALE OF 1 (POOR) TO 5 (EXCELLENT)]?: FAIR
IN GENERAL, WOULD YOU SAY YOUR HEALTH IS...[ON A SCALE OF 1 (POOR) TO 5 (EXCELLENT)]: FAIR
HOW IS THE PROMIS V1.1 BEING ADMINISTERED?: ELECTRONIC
IN THE PAST 7 DAYS, HOW WOULD YOU RATE YOUR PAIN ON AVERAGE [ON A SCALE FROM 0 (NO PAIN) TO 10 (WORST IMAGINABLE PAIN)]?: 7
IN THE PAST 7 DAYS, HOW WOULD YOU RATE YOUR FATIGUE ON AVERAGE [ON A SCALE FROM 1 (NONE) TO 5 (VERY SEVERE)]?: MODERATE
IN GENERAL, HOW WOULD YOU RATE YOUR MENTAL HEALTH, INCLUDING YOUR MOOD AND YOUR ABILITY TO THINK [ON A SCALE OF 1 (POOR) TO 5 (EXCELLENT)]?: GOOD
IN GENERAL, PLEASE RATE HOW WELL YOU CARRY OUT YOUR USUAL SOCIAL ACTIVITIES (INCLUDES ACTIVITIES AT HOME, AT WORK, AND IN YOUR COMMUNITY, AND RESPONSIBILITIES AS A PARENT, CHILD, SPOUSE, EMPLOYEE, FRIEND, ETC) [ON A SCALE OF 1 (POOR) TO 5 (EXCELLENT)]?: FAIR
IN THE PAST 7 DAYS, HOW OFTEN HAVE YOU BEEN BOTHERED BY EMOTIONAL PROBLEMS, SUCH AS FEELING ANXIOUS, DEPRESSED, OR IRRITABLE [ON A SCALE FROM 1 (NEVER) TO 5 (ALWAYS)]?: OFTEN
IN GENERAL, HOW WOULD YOU RATE YOUR SATISFACTION WITH YOUR SOCIAL ACTIVITIES AND RELATIONSHIPS [ON A SCALE OF 1 (POOR) TO 5 (EXCELLENT)]?: FAIR
TO WHAT EXTENT ARE YOU ABLE TO CARRY OUT YOUR EVERYDAY PHYSICAL ACTIVITIES SUCH AS WALKING, CLIMBING STAIRS, CARRYING GROCERIES, OR MOVING A CHAIR [ON A SCALE OF 1 (NOT AT ALL) TO 5 (COMPLETELY)]?: A LITTLE

## 2025-04-21 ENCOUNTER — TELEPHONE (OUTPATIENT)
Dept: ORTHOPEDIC SURGERY | Age: 60
End: 2025-04-21

## 2025-04-21 ENCOUNTER — OFFICE VISIT (OUTPATIENT)
Dept: ORTHOPEDIC SURGERY | Age: 60
End: 2025-04-21

## 2025-04-21 DIAGNOSIS — Z96.651 S/P TOTAL KNEE ARTHROPLASTY, RIGHT: Primary | ICD-10-CM

## 2025-04-21 PROCEDURE — 99024 POSTOP FOLLOW-UP VISIT: CPT | Performed by: ORTHOPAEDIC SURGERY

## 2025-04-21 RX ORDER — OXYCODONE HYDROCHLORIDE 5 MG/1
5 TABLET ORAL EVERY 4 HOURS PRN
Qty: 30 TABLET | Refills: 0 | Status: SHIPPED | OUTPATIENT
Start: 2025-04-21 | End: 2025-04-28

## 2025-04-21 NOTE — TELEPHONE ENCOUNTER
Called [facility] explaining to the patient that Dr. Kern called in his Oxycodone, and recommends buying Benadryl over the counter for the itching in his legs as well as buying the 81mg asa over the counter.

## 2025-04-21 NOTE — PROGRESS NOTES
Name: Yazan Lorenzo  YOB: 1965  Gender: male  MRN: [id number]      Current Outpatient Medications:     aspirin 81 MG EC tablet, Take 1 tablet by mouth in the morning and 1 tablet in the evening., Disp: 70 tablet, Rfl: 0    celecoxib (CELEBREX) 200 MG capsule, Take 1 capsule by mouth in the morning and at bedtime, Disp: 60 capsule, Rfl: 1    sennosides-docusate sodium (SENOKOT-S) 8.6-50 MG tablet, Take 1 tablet by mouth daily, Disp: 30 tablet, Rfl: 1    acetaminophen (TYLENOL) 325 MG tablet, Take 3 tablets by mouth in the morning and 3 tablets at noon and 3 tablets in the evening., Disp: 60 tablet, Rfl: 2    allopurinol (ZYLOPRIM) 100 MG tablet, Take 1 tablet by mouth 2 times daily, Disp: , Rfl:     valsartan (DIOVAN) 40 MG tablet, Take 1 tablet by mouth daily, Disp: , Rfl:     traMADol (ULTRAM) 50 MG tablet, Take 1 tablet by mouth every 6 hours as needed. Max Daily Amount: 200 mg, Disp: , Rfl:     polyethylene glycol (GLYCOLAX) 17 GM/SCOOP powder, Take 17 g by mouth as needed, Disp: , Rfl:     naloxone 4 MG/0.1ML LIQD nasal spray, 1 spray by Nasal route once as needed, Disp: , Rfl:     Multiple Vitamins-Minerals (ONCOVITE) TABS, Take 1 tablet by mouth every morning, Disp: , Rfl:     methocarbamol (ROBAXIN) 750 MG tablet, Take 1 tablet by mouth 3 times daily as needed, Disp: , Rfl:     acetaminophen (TYLENOL) 500 MG tablet, Take 1 tablet by mouth 3 times daily as needed, Disp: , Rfl:     B Complex Vitamins (VITAMIN-B COMPLEX PO), Take by mouth every morning, Disp: , Rfl:     Omega-3 Fatty Acids (OMEGA-3 FISH OIL PO), Take 1 capsule by mouth daily, Disp: , Rfl:     ascorbic acid (VITAMIN C) 500 MG tablet, Take 1 tablet by mouth daily, Disp: , Rfl:     betamethasone dipropionate 0.05 % cream, Apply topically as needed, Disp: , Rfl:     diclofenac sodium (VOLTAREN) 1 % GEL, Apply topically as needed, Disp: , Rfl:     montelukast (SINGULAIR) 10 MG tablet, Take 1 tablet by mouth daily, Disp: , Rfl:

## 2025-04-21 NOTE — TELEPHONE ENCOUNTER
Patient called stating that he needs a refill for the aspirin 81 mg and would like something for pain.  He mentions in the voicemail oxycodone.  He would also like something called in for the itching on his legs.

## 2025-05-19 ENCOUNTER — OFFICE VISIT (OUTPATIENT)
Dept: ORTHOPEDIC SURGERY | Age: 60
End: 2025-05-19

## 2025-05-19 DIAGNOSIS — Z96.651 S/P TOTAL KNEE ARTHROPLASTY, RIGHT: Primary | ICD-10-CM

## 2025-05-19 PROCEDURE — 99024 POSTOP FOLLOW-UP VISIT: CPT | Performed by: ORTHOPAEDIC SURGERY

## 2025-05-19 NOTE — PROGRESS NOTES
Name: Yazan Lorenzo  YOB: 1965  Gender: male  MRN: [id number]      Right Post-Op TKA 6 week follow up DOS 4/1/25    This patient returns now six week status post s/p TKA. Things have gone reasonably well with therapy and the patient is now weaning from the cane.  He is currently using a rolling walker for back spasms the pain level has improved. There has been no significant problem since the patient was last seen.     Exam:   Incision c/d/I, well healed  ROM is 5 to 120  Stable to varus valgus stress at 0 and 30  No increased shuck at 90  Fires ehl fhl ta gs p   Splt s/s/sp/dp/t  Wwp bcr    Radiographs are obtained. Standing AP, flexion lateral and sunrise views of the Right knee demonstrates well positioned TKA with good bone implant interfaces. No significant abnormalities are seen.  Cementless total knee arthroplasty with on resurfaced patella    RADIOGRAPHIC IMPRESSION: Stable RightTKA.           CLINICAL IMPRESSION AND PLAN: Now six weeks s/p TKA .  The patient is doing reasonably well. They can continue to WBAT to wean from assistive device as they are able.  They will continue to work with physical therapy.  Recommend he continue therapy for quad and hamstring strengthening.  He can follow-up in 1 year or sooner if he is having issues    Kirt Kern MD

## (undated) DEVICE — 3M™ STERI-DRAPE™ U-DRAPE 1015: Brand: STERI-DRAPE™

## (undated) DEVICE — STERILE PVP: Brand: MEDLINE INDUSTRIES, INC.

## (undated) DEVICE — STERILE SYNTHETIC POLYISOPRENE POWDER-FREE SURGICAL GLOVES WITH HYDROGEL COATING, SMOOTH FINISH, STRAIGHT FINGER: Brand: PROTEXIS

## (undated) DEVICE — BANDAGE COMPR W6INXL12FT SMOOTH FOR LIMB EXSANG ESMARCH

## (undated) DEVICE — SUTURE VICRYL + SZ 1 L18IN ABSRB UD L36MM CT-1 1/2 CIR VCP841D

## (undated) DEVICE — TOWEL,OR,DSP,ST,NATURAL,DLX,4/PK,20PK/CS: Brand: MEDLINE

## (undated) DEVICE — SOLUTION IRRIG 3000ML 0.9% SOD CHL USP UROMATIC PLAS CONT

## (undated) DEVICE — GLOVE SURG SZ 8 L12IN FNGR THK79MIL GRN LTX FREE

## (undated) DEVICE — 3M™ IOBAN™ 2 ANTIMICROBIAL INCISE DRAPE 6651EZ: Brand: IOBAN™ 2

## (undated) DEVICE — DRESSING HYDROFIBER AQUACEL AG ADVANTAGE 3.5X12 IN

## (undated) DEVICE — DRAPE,U/SHT,SPLIT,FILM,60X84,STERILE: Brand: MEDLINE

## (undated) DEVICE — TOTAL KNEE: Brand: MEDLINE INDUSTRIES, INC.

## (undated) DEVICE — RECIPROCATING BLADE HEAVY DUTY LONG, OFFSET  (77.6 X 0.77 X 11.2MM)

## (undated) DEVICE — SUTURE ABS ANTIBACT 1-0 CTX 24IN STRATAFIX PDS+ SXPP1A445

## (undated) DEVICE — SYRINGE MED 10ML LUERLOCK TIP W/O SFTY DISP

## (undated) DEVICE — ZIP 24 SURGICAL SKIN CLOSURE DEVICE: Brand: ZIP 24 SURGICAL SKIN CLOSURE DEVICE

## (undated) DEVICE — NEEDLE HYPO 21GA L1.5IN INTRAMUSCULAR S STL LATCH BVL UP

## (undated) DEVICE — SUPPORT POS LEG COND DISP PD FOR TOT KNEE REPL

## (undated) DEVICE — SUTURE VICRYL SZ 0 L36IN ABSRB UD CT-1 L36MM 1/2 CIR TAPR PNT VCP946H

## (undated) DEVICE — HOOD: Brand: T7PLUS

## (undated) DEVICE — DUAL CUT SAGITTAL BLADE

## (undated) DEVICE — BNDG,ELSTC,MATRIX,STRL,6"X5YD,LF,HOOK&LP: Brand: MEDLINE

## (undated) DEVICE — BANDAGE COBAN 6 IN WND 6INX5YD FOAM

## (undated) DEVICE — ZIP DS DRESSING SHIELD: Brand: ZIP DS DRESSING SHIELD

## (undated) DEVICE — SUIT SURG ISOLATN ZIP TOGA XL T7 +

## (undated) DEVICE — SUTURE VICRYL SZ 2-0 L36IN ABSRB UD L36MM CT-1 1/2 CIR J945H

## (undated) DEVICE — STOCKINETTE,DOUBLE PLY,4X48,STERILE: Brand: MEDLINE